# Patient Record
Sex: MALE | Race: WHITE | NOT HISPANIC OR LATINO | Employment: OTHER | URBAN - METROPOLITAN AREA
[De-identification: names, ages, dates, MRNs, and addresses within clinical notes are randomized per-mention and may not be internally consistent; named-entity substitution may affect disease eponyms.]

---

## 2023-10-29 ENCOUNTER — HOSPITAL ENCOUNTER (EMERGENCY)
Facility: HOSPITAL | Age: 38
Discharge: HOME | DRG: 885 | End: 2023-10-30
Attending: EMERGENCY MEDICINE | Admitting: EMERGENCY MEDICINE
Payer: MEDICARE

## 2023-10-29 DIAGNOSIS — F32.A DEPRESSION, UNSPECIFIED DEPRESSION TYPE: ICD-10-CM

## 2023-10-29 DIAGNOSIS — F19.10 SUBSTANCE ABUSE: Primary | ICD-10-CM

## 2023-10-29 LAB
ALBUMIN SERPL-MCNC: 4.8 G/DL (ref 3.5–5.2)
ALBUMIN/GLOB SERPL: 1.7 G/DL
ALP SERPL-CCNC: 125 U/L (ref 39–117)
ALT SERPL W P-5'-P-CCNC: 10 U/L (ref 1–41)
AMPHET+METHAMPHET UR QL: POSITIVE
ANION GAP SERPL CALCULATED.3IONS-SCNC: 12.3 MMOL/L (ref 5–15)
APAP SERPL-MCNC: <5 MCG/ML (ref 0–30)
AST SERPL-CCNC: 12 U/L (ref 1–40)
BARBITURATES UR QL SCN: NEGATIVE
BASOPHILS # BLD AUTO: 0.13 10*3/MM3 (ref 0–0.2)
BASOPHILS NFR BLD AUTO: 1.1 % (ref 0–1.5)
BENZODIAZ UR QL SCN: NEGATIVE
BILIRUB SERPL-MCNC: 0.5 MG/DL (ref 0–1.2)
BUN SERPL-MCNC: 24 MG/DL (ref 6–20)
BUN/CREAT SERPL: 25.3 (ref 7–25)
CALCIUM SPEC-SCNC: 9.3 MG/DL (ref 8.6–10.5)
CANNABINOIDS SERPL QL: NEGATIVE
CHLORIDE SERPL-SCNC: 96 MMOL/L (ref 98–107)
CO2 SERPL-SCNC: 26.7 MMOL/L (ref 22–29)
COCAINE UR QL: NEGATIVE
CREAT SERPL-MCNC: 0.95 MG/DL (ref 0.76–1.27)
DEPRECATED RDW RBC AUTO: 37.8 FL (ref 37–54)
EGFRCR SERPLBLD CKD-EPI 2021: 106.4 ML/MIN/1.73
EOSINOPHIL # BLD AUTO: 0.2 10*3/MM3 (ref 0–0.4)
EOSINOPHIL NFR BLD AUTO: 1.8 % (ref 0.3–6.2)
ERYTHROCYTE [DISTWIDTH] IN BLOOD BY AUTOMATED COUNT: 12.6 % (ref 12.3–15.4)
ETHANOL BLD-MCNC: <10 MG/DL (ref 0–10)
ETHANOL UR QL: <0.01 %
FENTANYL UR-MCNC: NEGATIVE NG/ML
GLOBULIN UR ELPH-MCNC: 2.8 GM/DL
GLUCOSE SERPL-MCNC: 93 MG/DL (ref 65–99)
HCT VFR BLD AUTO: 47.1 % (ref 37.5–51)
HGB BLD-MCNC: 16.1 G/DL (ref 13–17.7)
HOLD SPECIMEN: NORMAL
HOLD SPECIMEN: NORMAL
IMM GRANULOCYTES # BLD AUTO: 0.05 10*3/MM3 (ref 0–0.05)
IMM GRANULOCYTES NFR BLD AUTO: 0.4 % (ref 0–0.5)
LYMPHOCYTES # BLD AUTO: 3.71 10*3/MM3 (ref 0.7–3.1)
LYMPHOCYTES NFR BLD AUTO: 32.5 % (ref 19.6–45.3)
MCH RBC QN AUTO: 28.1 PG (ref 26.6–33)
MCHC RBC AUTO-ENTMCNC: 34.2 G/DL (ref 31.5–35.7)
MCV RBC AUTO: 82.3 FL (ref 79–97)
METHADONE UR QL SCN: NEGATIVE
MONOCYTES # BLD AUTO: 0.93 10*3/MM3 (ref 0.1–0.9)
MONOCYTES NFR BLD AUTO: 8.2 % (ref 5–12)
NEUTROPHILS NFR BLD AUTO: 56 % (ref 42.7–76)
NEUTROPHILS NFR BLD AUTO: 6.39 10*3/MM3 (ref 1.7–7)
NRBC BLD AUTO-RTO: 0 /100 WBC (ref 0–0.2)
OPIATES UR QL: NEGATIVE
OXYCODONE UR QL SCN: NEGATIVE
PLATELET # BLD AUTO: 381 10*3/MM3 (ref 140–450)
PMV BLD AUTO: 9.8 FL (ref 6–12)
POTASSIUM SERPL-SCNC: 3.8 MMOL/L (ref 3.5–5.2)
PROT SERPL-MCNC: 7.6 G/DL (ref 6–8.5)
RBC # BLD AUTO: 5.72 10*6/MM3 (ref 4.14–5.8)
SALICYLATES SERPL-MCNC: <0.3 MG/DL
SODIUM SERPL-SCNC: 135 MMOL/L (ref 136–145)
WBC NRBC COR # BLD: 11.41 10*3/MM3 (ref 3.4–10.8)
WHOLE BLOOD HOLD COAG: NORMAL
WHOLE BLOOD HOLD SPECIMEN: NORMAL

## 2023-10-29 PROCEDURE — 80307 DRUG TEST PRSMV CHEM ANLYZR: CPT | Performed by: EMERGENCY MEDICINE

## 2023-10-29 PROCEDURE — 85025 COMPLETE CBC W/AUTO DIFF WBC: CPT | Performed by: EMERGENCY MEDICINE

## 2023-10-29 PROCEDURE — 36415 COLL VENOUS BLD VENIPUNCTURE: CPT

## 2023-10-29 PROCEDURE — 82077 ASSAY SPEC XCP UR&BREATH IA: CPT | Performed by: EMERGENCY MEDICINE

## 2023-10-29 PROCEDURE — 99283 EMERGENCY DEPT VISIT LOW MDM: CPT

## 2023-10-29 PROCEDURE — 80053 COMPREHEN METABOLIC PANEL: CPT | Performed by: EMERGENCY MEDICINE

## 2023-10-29 PROCEDURE — 80179 DRUG ASSAY SALICYLATE: CPT | Performed by: EMERGENCY MEDICINE

## 2023-10-29 PROCEDURE — 80143 DRUG ASSAY ACETAMINOPHEN: CPT | Performed by: EMERGENCY MEDICINE

## 2023-10-29 RX ORDER — SODIUM CHLORIDE 0.9 % (FLUSH) 0.9 %
10 SYRINGE (ML) INJECTION AS NEEDED
Status: DISCONTINUED | OUTPATIENT
Start: 2023-10-29 | End: 2023-10-30 | Stop reason: HOSPADM

## 2023-10-30 ENCOUNTER — HOSPITAL ENCOUNTER (INPATIENT)
Facility: HOSPITAL | Age: 38
LOS: 4 days | Discharge: HOME | DRG: 885 | End: 2023-11-03
Attending: PSYCHIATRY & NEUROLOGY | Admitting: PSYCHIATRY & NEUROLOGY
Payer: MEDICARE

## 2023-10-30 ENCOUNTER — HOSPITAL ENCOUNTER (EMERGENCY)
Facility: HOSPITAL | Age: 38
Discharge: PSYCHIATRIC OR SUBSTANCE USE FACILITY | DRG: 885 | End: 2023-10-30
Attending: EMERGENCY MEDICINE | Admitting: EMERGENCY MEDICINE
Payer: MEDICARE

## 2023-10-30 VITALS
TEMPERATURE: 98.4 F | OXYGEN SATURATION: 98 % | WEIGHT: 127.87 LBS | BODY MASS INDEX: 18.94 KG/M2 | HEART RATE: 64 BPM | RESPIRATION RATE: 16 BRPM | SYSTOLIC BLOOD PRESSURE: 116 MMHG | DIASTOLIC BLOOD PRESSURE: 73 MMHG | HEIGHT: 69 IN

## 2023-10-30 VITALS
OXYGEN SATURATION: 97 % | SYSTOLIC BLOOD PRESSURE: 118 MMHG | DIASTOLIC BLOOD PRESSURE: 72 MMHG | HEIGHT: 69 IN | RESPIRATION RATE: 17 BRPM | HEART RATE: 72 BPM | WEIGHT: 127 LBS | BODY MASS INDEX: 18.81 KG/M2 | TEMPERATURE: 98.4 F

## 2023-10-30 DIAGNOSIS — F20.9 SCHIZOPHRENIA, UNSPECIFIED TYPE: ICD-10-CM

## 2023-10-30 DIAGNOSIS — F15.10 METHAMPHETAMINE ABUSE: Primary | ICD-10-CM

## 2023-10-30 PROBLEM — F33.2 SEVERE RECURRENT MAJOR DEPRESSION WITHOUT PSYCHOTIC FEATURES: Status: ACTIVE | Noted: 2023-10-30

## 2023-10-30 LAB
ALBUMIN SERPL-MCNC: 4.8 G/DL (ref 3.5–5.2)
ALBUMIN/GLOB SERPL: 1.8 G/DL
ALP SERPL-CCNC: 119 U/L (ref 39–117)
ALT SERPL W P-5'-P-CCNC: 10 U/L (ref 1–41)
AMPHET+METHAMPHET UR QL: POSITIVE
ANION GAP SERPL CALCULATED.3IONS-SCNC: 11.7 MMOL/L (ref 5–15)
APAP SERPL-MCNC: <5 MCG/ML (ref 0–30)
AST SERPL-CCNC: 13 U/L (ref 1–40)
BARBITURATES UR QL SCN: NEGATIVE
BASOPHILS # BLD AUTO: 0.1 10*3/MM3 (ref 0–0.2)
BASOPHILS NFR BLD AUTO: 1 % (ref 0–1.5)
BENZODIAZ UR QL SCN: NEGATIVE
BILIRUB SERPL-MCNC: 0.6 MG/DL (ref 0–1.2)
BUN SERPL-MCNC: 25 MG/DL (ref 6–20)
BUN/CREAT SERPL: 27.8 (ref 7–25)
CALCIUM SPEC-SCNC: 9.5 MG/DL (ref 8.6–10.5)
CANNABINOIDS SERPL QL: NEGATIVE
CHLORIDE SERPL-SCNC: 97 MMOL/L (ref 98–107)
CO2 SERPL-SCNC: 26.3 MMOL/L (ref 22–29)
COCAINE UR QL: NEGATIVE
CREAT SERPL-MCNC: 0.9 MG/DL (ref 0.76–1.27)
DEPRECATED RDW RBC AUTO: 37.6 FL (ref 37–54)
EGFRCR SERPLBLD CKD-EPI 2021: 113.5 ML/MIN/1.73
EOSINOPHIL # BLD AUTO: 0.15 10*3/MM3 (ref 0–0.4)
EOSINOPHIL NFR BLD AUTO: 1.4 % (ref 0.3–6.2)
ERYTHROCYTE [DISTWIDTH] IN BLOOD BY AUTOMATED COUNT: 12.5 % (ref 12.3–15.4)
ETHANOL BLD-MCNC: <10 MG/DL (ref 0–10)
ETHANOL UR QL: <0.01 %
FENTANYL UR-MCNC: NEGATIVE NG/ML
GLOBULIN UR ELPH-MCNC: 2.6 GM/DL
GLUCOSE SERPL-MCNC: 90 MG/DL (ref 65–99)
HCT VFR BLD AUTO: 46.6 % (ref 37.5–51)
HGB BLD-MCNC: 16.1 G/DL (ref 13–17.7)
IMM GRANULOCYTES # BLD AUTO: 0.05 10*3/MM3 (ref 0–0.05)
IMM GRANULOCYTES NFR BLD AUTO: 0.5 % (ref 0–0.5)
LYMPHOCYTES # BLD AUTO: 2.58 10*3/MM3 (ref 0.7–3.1)
LYMPHOCYTES NFR BLD AUTO: 24.9 % (ref 19.6–45.3)
MCH RBC QN AUTO: 28.5 PG (ref 26.6–33)
MCHC RBC AUTO-ENTMCNC: 34.5 G/DL (ref 31.5–35.7)
MCV RBC AUTO: 82.6 FL (ref 79–97)
METHADONE UR QL SCN: NEGATIVE
MONOCYTES # BLD AUTO: 0.84 10*3/MM3 (ref 0.1–0.9)
MONOCYTES NFR BLD AUTO: 8.1 % (ref 5–12)
NEUTROPHILS NFR BLD AUTO: 6.65 10*3/MM3 (ref 1.7–7)
NEUTROPHILS NFR BLD AUTO: 64.1 % (ref 42.7–76)
NRBC BLD AUTO-RTO: 0 /100 WBC (ref 0–0.2)
OPIATES UR QL: NEGATIVE
OXYCODONE UR QL SCN: NEGATIVE
PLATELET # BLD AUTO: 369 10*3/MM3 (ref 140–450)
PMV BLD AUTO: 10.2 FL (ref 6–12)
POTASSIUM SERPL-SCNC: 4.2 MMOL/L (ref 3.5–5.2)
PROT SERPL-MCNC: 7.4 G/DL (ref 6–8.5)
RBC # BLD AUTO: 5.64 10*6/MM3 (ref 4.14–5.8)
SALICYLATES SERPL-MCNC: <0.3 MG/DL
SODIUM SERPL-SCNC: 135 MMOL/L (ref 136–145)
WBC NRBC COR # BLD: 10.37 10*3/MM3 (ref 3.4–10.8)

## 2023-10-30 PROCEDURE — 82077 ASSAY SPEC XCP UR&BREATH IA: CPT | Performed by: EMERGENCY MEDICINE

## 2023-10-30 PROCEDURE — 80307 DRUG TEST PRSMV CHEM ANLYZR: CPT | Performed by: EMERGENCY MEDICINE

## 2023-10-30 PROCEDURE — 85025 COMPLETE CBC W/AUTO DIFF WBC: CPT | Performed by: EMERGENCY MEDICINE

## 2023-10-30 PROCEDURE — 80179 DRUG ASSAY SALICYLATE: CPT | Performed by: EMERGENCY MEDICINE

## 2023-10-30 PROCEDURE — 36415 COLL VENOUS BLD VENIPUNCTURE: CPT

## 2023-10-30 PROCEDURE — 99284 EMERGENCY DEPT VISIT MOD MDM: CPT

## 2023-10-30 PROCEDURE — 80053 COMPREHEN METABOLIC PANEL: CPT | Performed by: EMERGENCY MEDICINE

## 2023-10-30 PROCEDURE — 80143 DRUG ASSAY ACETAMINOPHEN: CPT | Performed by: EMERGENCY MEDICINE

## 2023-10-30 RX ORDER — LORAZEPAM 2 MG/1
2 TABLET ORAL EVERY 4 HOURS PRN
Status: DISCONTINUED | OUTPATIENT
Start: 2023-10-30 | End: 2023-11-03 | Stop reason: HOSPADM

## 2023-10-30 RX ORDER — ALUMINA, MAGNESIA, AND SIMETHICONE 2400; 2400; 240 MG/30ML; MG/30ML; MG/30ML
15 SUSPENSION ORAL EVERY 6 HOURS PRN
Status: DISCONTINUED | OUTPATIENT
Start: 2023-10-30 | End: 2023-11-03 | Stop reason: HOSPADM

## 2023-10-30 RX ORDER — OLANZAPINE 20 MG/1
20 TABLET ORAL NIGHTLY
COMMUNITY
End: 2023-11-03 | Stop reason: HOSPADM

## 2023-10-30 RX ORDER — NICOTINE 21 MG/24HR
1 PATCH, TRANSDERMAL 24 HOURS TRANSDERMAL DAILY PRN
Status: DISCONTINUED | OUTPATIENT
Start: 2023-10-30 | End: 2023-11-03 | Stop reason: HOSPADM

## 2023-10-30 RX ORDER — DIPHENHYDRAMINE HYDROCHLORIDE 50 MG/ML
50 INJECTION INTRAMUSCULAR; INTRAVENOUS EVERY 4 HOURS PRN
Status: DISCONTINUED | OUTPATIENT
Start: 2023-10-30 | End: 2023-11-03 | Stop reason: HOSPADM

## 2023-10-30 RX ORDER — SERTRALINE HYDROCHLORIDE 100 MG/1
200 TABLET, FILM COATED ORAL DAILY
COMMUNITY
End: 2023-11-03 | Stop reason: HOSPADM

## 2023-10-30 RX ORDER — ACETAMINOPHEN 325 MG/1
650 TABLET ORAL EVERY 4 HOURS PRN
Status: DISCONTINUED | OUTPATIENT
Start: 2023-10-30 | End: 2023-11-03 | Stop reason: HOSPADM

## 2023-10-30 RX ORDER — HALOPERIDOL 5 MG/ML
5 INJECTION INTRAMUSCULAR EVERY 4 HOURS PRN
Status: DISCONTINUED | OUTPATIENT
Start: 2023-10-30 | End: 2023-11-03 | Stop reason: HOSPADM

## 2023-10-30 RX ORDER — DIPHENHYDRAMINE HCL 50 MG
50 CAPSULE ORAL EVERY 4 HOURS PRN
Status: DISCONTINUED | OUTPATIENT
Start: 2023-10-30 | End: 2023-11-03 | Stop reason: HOSPADM

## 2023-10-30 RX ORDER — TRAZODONE HYDROCHLORIDE 50 MG/1
100 TABLET ORAL NIGHTLY PRN
Status: DISCONTINUED | OUTPATIENT
Start: 2023-10-30 | End: 2023-11-03 | Stop reason: HOSPADM

## 2023-10-30 RX ORDER — HYDROXYZINE PAMOATE 50 MG/1
50 CAPSULE ORAL EVERY 6 HOURS PRN
Status: DISCONTINUED | OUTPATIENT
Start: 2023-10-30 | End: 2023-11-03 | Stop reason: HOSPADM

## 2023-10-30 RX ORDER — LORAZEPAM 2 MG/ML
2 INJECTION INTRAMUSCULAR EVERY 4 HOURS PRN
Status: DISCONTINUED | OUTPATIENT
Start: 2023-10-30 | End: 2023-11-03 | Stop reason: HOSPADM

## 2023-10-30 RX ORDER — HALOPERIDOL 5 MG/1
5 TABLET ORAL EVERY 4 HOURS PRN
Status: DISCONTINUED | OUTPATIENT
Start: 2023-10-30 | End: 2023-11-03 | Stop reason: HOSPADM

## 2023-10-30 RX ORDER — OLANZAPINE 10 MG/1
10 TABLET ORAL NIGHTLY
Status: DISCONTINUED | OUTPATIENT
Start: 2023-10-30 | End: 2023-11-01

## 2023-10-30 RX ORDER — BENZTROPINE MESYLATE 1 MG/1
1 TABLET ORAL 2 TIMES DAILY
COMMUNITY
End: 2023-10-30

## 2023-10-30 RX ORDER — LOPERAMIDE HYDROCHLORIDE 2 MG/1
2 CAPSULE ORAL
Status: DISCONTINUED | OUTPATIENT
Start: 2023-10-30 | End: 2023-11-03 | Stop reason: HOSPADM

## 2023-10-30 RX ADMIN — OLANZAPINE 10 MG: 10 TABLET, FILM COATED ORAL at 21:19

## 2023-10-30 RX ADMIN — SERTRALINE HYDROCHLORIDE 50 MG: 50 TABLET ORAL at 16:41

## 2023-10-30 NOTE — SIGNIFICANT NOTE
10/30/23 1010   Plan   Plan Comments Pt is agreeable to go to rehab for substance use treatment. SW contacted [facility] who has bed availability. SW sent over labs and SW assessment. Awaiting call for patient to complete phone assessment.

## 2023-10-30 NOTE — NURSING NOTE
PT ARRIVED AT 1547 FROM ED ESCORTED BY SECURITY VIA W/C PT IS ADMITTED VOLUNTARY UNDER THE CARE OF DR. NAGEL FOR MAJOR DEPRESSION WITHOUT PSYCHOTIC FEATURES. PT SEARCHED BY SECURITY PER PROTOCOL AND BELONGINGS SEARCH. PT IS COOPERATIVE BUT ANXIOUS, HYPERVERBAL, ILLOGICAL, NONSENSICAL AT TIMES, POOR EYE CONTACT, PT IS HAVING FREQUENT JERKY  BODY MOVEMENTS, POSITIVE FOR METHAMPHETAMINES, PT DENIES CURRENT THOUGHTS OF SUICIDE AND DENIES HI AND DENIES [facility], [facility] SUICIDE RISK SCORE IS LOW PT  STATES HE LIVES WITH FATHER AND GRANDMOTHER IN [place], [place] AND UNEMPLOYED, PT STATES HE DOES NOT USE ALCOHOL AND HAS BEEN TO [facility]. PT STATES HIS HIGHEST LEVEL OF EDUCATION IS 9TH GRADE, PT IS HARD TO ENGAGE DUE TO MENTAL STATUS AND UNABLE TO ANSWER SOME QUESTIONS AND GIVES VAGUE ANSWERS AT TIMES. PT DENIES ANXIETY OR DEPRESSION. PT STATES HE HAS LOST A SIGNIFICANT AMOUNT OF WEIGHT DUE TO NO APPETITE. SUPPLEMENT ORDERS IN PLACE. PT DENIES PAIN, PT ATE SUPPER AND HAD A COLA. AND WAS LAUGHING INAPPROPRIATELY WIT A PEER AT THE DAYROOM TABLE, NO S/S OF DISTRESS AT THIS TIME, PT HAS HISTORY OF SUBSTANCE ABUSE AND IS WANTING TO GO TO REHAB, PT STATE HE IS SUPPOSED TO BE ON MEDICATIONS BUT HASN'T BEEN TAKING THEM, PT ORIENTED TO UNIT. WILL CONTINUE TO MONITOR PT AND PROVIDE SAFE ENVIRONMENT, PT TOOK [place]OLOFT WITHOUT ISSUES.

## 2023-10-30 NOTE — ED PROVIDER NOTES
"Time: 12:47 AM EDT  Date of encounter:  10/29/2023  Independent Historian/Clinical History and Information was obtained by:   Patient    History is limited by: N/A    Chief Complaint: suicide ideation      History of Present Illness:  Patient is a 38 y.o. year old male who presents to the emergency department for evaluation of Suicide ideation.  Patient denies any specific plan.  Says been feeling like this for quite some time.  Denies any association.  No audio/visual hallucinations.  He does admit to methamphetamine abuse.  No other complaints.    HPI    Patient Care Team  Primary Care Provider: Amanda Macias MD    Past Medical History:     No Known Allergies  History reviewed. No pertinent past medical history.  History reviewed. No pertinent surgical history.  History reviewed. No pertinent family history.    Home Medications:  Prior to Admission medications    Not on File        Social History:   Social History     Substance Use Topics    Drug use: Yes     Types: Methamphetamines         Review of Systems:  Review of Systems   Constitutional:  Negative for chills and fever.   HENT:  Negative for congestion, ear pain and sore throat.    Eyes:  Negative for pain.   Respiratory:  Negative for cough, chest tightness and shortness of breath.    Cardiovascular:  Negative for chest pain.   Gastrointestinal:  Negative for abdominal pain, diarrhea, nausea and vomiting.   Genitourinary:  Negative for flank pain and hematuria.   Musculoskeletal:  Negative for joint swelling.   Skin:  Negative for pallor.   Neurological:  Negative for seizures and headaches.   Psychiatric/Behavioral:  The patient is nervous/anxious.    All other systems reviewed and are negative.       Physical Exam:  /73 (BP Location: Right arm, Patient Position: Lying)   Pulse 64   Temp 98.4 °F (36.9 °C) (Oral)   Resp 16   Ht 175.3 cm (69\")   Wt 58 kg (127 lb 13.9 oz)   SpO2 98%   BMI 18.88 kg/m²     Physical Exam  Constitutional:       " Appearance: Normal appearance.   HENT:      Head: Normocephalic and atraumatic.      Nose: Nose normal.      Mouth/Throat:      Mouth: Mucous membranes are moist.   Eyes:      Extraocular Movements: Extraocular movements intact.      Conjunctiva/sclera: Conjunctivae normal.      Pupils: Pupils are equal, round, and reactive to light.   Cardiovascular:      Rate and Rhythm: Normal rate and regular rhythm.      Pulses: Normal pulses.      Heart sounds: Normal heart sounds.   Pulmonary:      Effort: Pulmonary effort is normal.      Breath sounds: Normal breath sounds.   Abdominal:      General: There is no distension.      Palpations: Abdomen is soft.      Tenderness: There is no abdominal tenderness.   Musculoskeletal:         General: Normal range of motion.      Cervical back: Normal range of motion.   Skin:     General: Skin is warm and dry.      Capillary Refill: Capillary refill takes less than 2 seconds.   Neurological:      General: No focal deficit present.      Mental Status: He is alert and oriented to person, place, and time. Mental status is at baseline.   Psychiatric:         Mood and Affect: Mood normal.         Behavior: Behavior normal.                  Procedures:  Procedures      Medical Decision Making:      Comorbidities that affect care:    Substance Abuse    External Notes reviewed:    None      The following orders were placed and all results were independently analyzed by me:  Orders Placed This Encounter   Procedures    [place] Draw    Comprehensive Metabolic Panel    Acetaminophen Level    Ethanol    Salicylate Level    Urine Drug Screen - Urine, Clean Catch    CBC Auto Differential    NPO Diet NPO Type: Strict NPO    Continuous Pulse Oximetry    Vital Signs    Undress & Gown    Psych / Access to See    Inpatient [facility] Consult    Oxygen Therapy- Nasal Cannula; Titrate 1-6 LPM Per SpO2; 90 - 95%    POC Glucose Once    Insert Peripheral IV    Suicide Precautions    CBC & Differential     Green Top (Gel)    Lavender Top    Gold Top - SST    Light Blue Top       Medications Given in the Emergency Department:  Medications   sodium chloride 0.9 % flush 10 mL (has no administration in time range)        ED Course:         Labs:    Lab Results (last 24 hours)       Procedure Component Value Units Date/Time    CBC & Differential [[id number]]  (Abnormal) Collected: 10/29/23 2202    Specimen: Blood Updated: 10/29/23 2212    Narrative:      The following orders were created for panel order CBC & Differential.  Procedure                               Abnormality         Status                     ---------                               -----------         ------                     CBC Auto Differential[[id number]]        Abnormal            Final result                 Please view results for these tests on the individual orders.    Comprehensive Metabolic Panel [[id number]]  (Abnormal) Collected: 10/29/23 2202    Specimen: Blood Updated: 10/29/23 2231     Glucose 93 mg/dL      BUN 24 mg/dL      Creatinine 0.95 mg/dL      Sodium 135 mmol/L      Potassium 3.8 mmol/L      Chloride 96 mmol/L      CO2 26.7 mmol/L      Calcium 9.3 mg/dL      Total Protein 7.6 g/dL      Albumin 4.8 g/dL      ALT (SGPT) 10 U/L      AST (SGOT) 12 U/L      Alkaline Phosphatase 125 U/L      Total Bilirubin 0.5 mg/dL      Globulin 2.8 gm/dL      A/G Ratio 1.7 g/dL      BUN/Creatinine Ratio 25.3     Anion Gap 12.3 mmol/L      eGFR 106.4 mL/min/1.73     Narrative:      GFR Normal >60  Chronic Kidney Disease <60  Kidney Failure <15      Acetaminophen Level [[id number]]  (Normal) Collected: 10/29/23 2202    Specimen: Blood Updated: 10/29/23 2231     Acetaminophen <5.0 mcg/mL     Ethanol [[id number]] Collected: 10/29/23 2202    Specimen: Blood Updated: 10/29/23 2231     Ethanol <10 mg/dL      Ethanol % <0.010 %     Narrative:      Ethanol (Plasma)  <10 Essentially Negative    Toxic Concentrations           mg/dL    Flushing, slowing of reflexes     Impaired visual activity         Depression of CNS              >100  Possible Coma                  >300       Salicylate Level [[id number]]  (Normal) Collected: 10/29/23 2202    Specimen: Blood Updated: 10/29/23 2231     Salicylate <0.3 mg/dL     CBC Auto Differential [[id number]]  (Abnormal) Collected: 10/29/23 2202    Specimen: Blood Updated: 10/29/23 2212     WBC 11.41 10*3/mm3      RBC 5.72 10*6/mm3      Hemoglobin 16.1 g/dL      Hematocrit 47.1 %      MCV 82.3 fL      MCH 28.1 pg      MCHC 34.2 g/dL      RDW 12.6 %      RDW-SD 37.8 fl      MPV 9.8 fL      Platelets 381 10*3/mm3      Neutrophil % 56.0 %      Lymphocyte % 32.5 %      Monocyte % 8.2 %      Eosinophil % 1.8 %      Basophil % 1.1 %      Immature Grans % 0.4 %      Neutrophils, Absolute 6.39 10*3/mm3      Lymphocytes, Absolute 3.71 10*3/mm3      Monocytes, Absolute 0.93 10*3/mm3      Eosinophils, Absolute 0.20 10*3/mm3      Basophils, Absolute 0.13 10*3/mm3      Immature Grans, Absolute 0.05 10*3/mm3      nRBC 0.0 /100 WBC     Urine Drug Screen - Urine, Clean Catch [[id number]]  (Abnormal) Collected: 10/29/23 2256    Specimen: Urine, Clean Catch Updated: 10/29/23 2319     Amphet/Methamphet, Screen Positive     Barbiturates Screen, Urine Negative     Benzodiazepine Screen, Urine Negative     Cocaine Screen, Urine Negative     Opiate Screen Negative     THC, Screen, Urine Negative     Methadone Screen, Urine Negative     Oxycodone Screen, Urine Negative     Fentanyl, Urine Negative    Narrative:      Negative Thresholds Per Drugs Screened:    Amphetamines                 500 ng/ml  Barbiturates                 200 ng/ml  Benzodiazepines              100 ng/ml  Cocaine                      300 ng/ml  Methadone                    300 ng/ml  Opiates                      300 ng/ml  Oxycodone                    100 ng/ml  THC                           50 ng/ml  Fentanyl                       5 ng/ml      The Normal Value for all drugs tested is  negative. This report includes final unconfirmed screening results to be used for medical treatment purposes only. Unconfirmed results must not be used for non-medical purposes such as employment or legal testing. Clinical consideration should be applied to any drug of abuse test, particularly when unconfirmed results are used.                     Imaging:    No Radiology Exams Resulted Within Past 24 Hours      Differential Diagnosis and Discussion:    Psychiatric: Differential diagnosis includes but is not limited to depression, psychosis, bipolar disorder, anxiety, manic episode, schizophrenia, and substance abuse.    All labs were reviewed and interpreted by me.    MDM  Number of Diagnoses or Management Options  Diagnosis management comments: Patient is afebrile nontoxic-appearing.  Vital signs are stable.  Patient initially presented with report of suicidal ideation.  Patient denies any specific plan.  He does admit to substance abuse.  Labs were obtained and showed no significant abnormality.  We attempted contact [facility]are but was unable to get in touch with anyone.  Reevaluation patient states he is not suicidal currently.  He states he feels safe to go home.  Patient was monitored for over 8 hours he continued to state that he is not suicidal anymore and wants to go home.  Recommend that he follows up with a psychologist/psychiatrist as well as his primary physician.  Recommend that he stop using drugs.  Patient agreed to return to the emergency department for any concerning symptoms or any thoughts of suicide.  He denies HI/AH/VH.  Discussed return precautions, discharge instructions and answered all his questions.       Amount and/or Complexity of Data Reviewed  Clinical lab tests: reviewed  Review and summarize past medical records: yes  Independent visualization of images, tracings, or specimens: yes    Risk of Complications, Morbidity, and/or Mortality  Presenting problems: moderate  Management  options: moderate    Patient Progress  Patient progress: stable             Patient Care Considerations:    CT HEAD: I considered ordering a noncontrast CT of the head, however patient is alert and oriented with no focal deficits      Consultants/Shared Management Plan:    None    Social Determinants of Health:    Patient is independent, reliable, and has access to care.       Disposition and Care Coordination:    Discharged: I considered escalation of care by admitting this patient for observation, however the patient has improved and is suitable and  stable for discharge.    I have explained the patient´s condition, diagnoses and treatment plan based on the information available to me at this time. I have answered questions and addressed any concerns. The patient has a good  understanding of the patient´s diagnosis, condition, and treatment plan as can be expected at this point. The vital signs have been stable. The patient´s condition is stable and appropriate for discharge from the emergency department.      The patient will pursue further outpatient evaluation with the primary care physician or other designated or consulting physician as outlined in the discharge instructions. They are agreeable to this plan of care and follow-up instructions have been explained in detail. The patient has received these instructions in written format and have expressed an understanding of the discharge instructions. The patient is aware that any significant change in condition or worsening of symptoms should prompt an immediate return to this or the closest emergency department or call to 911.  I have explained discharge medications and the need for follow up with the patient/caretakers. This was also printed in the discharge instructions. Patient was discharged with the following medications and follow up:      Medication List      No changes were made to your prescriptions during this visit.      Amanda Macias MD  [address]  [place] [address]  [phone]    In 2 days         Final diagnoses:   Substance abuse   Depression, unspecified depression type        ED Disposition       ED Disposition   Discharge    Condition   Stable    Comment   --               This medical record created using voice recognition software.             Ezio Nobles MD  10/30/23 0557

## 2023-10-30 NOTE — ED PROVIDER NOTES
"Time: 8:38 AM EDT  Date of encounter:  10/30/2023  Independent Historian/Clinical History and Information was obtained by:   Patient    History is limited by:  Poor historian    Chief Complaint: Methamphetamine abuse      History of Present Illness:  Patient is a 38 y.o. year old male who presents to the emergency department for evaluation of methamphetamine abuse.  This patient was seen last night in the emergency department for psychiatric evaluation.  Patient had stated he had had suicidal thoughts but was not presently having them.  He denies that today.  Patient does admit to methamphetamine abuse and was positive on his urine drug screen.  Patient today was apparently wandering on the hospital grounds and seemed confused and was brought back to the emergency department.  Patient is stating he would be interested in drug rehab.  Patient has history of schizophrenia and is on disability and is not currently taking his medications which include Cogentin, Zyprexa and Zoloft.    HPI    Patient Care Team  Primary Care Provider: Amanda Macias MD    Past Medical History:     No Known Allergies  Past Medical History:   Diagnosis Date    Schizophrenia      History reviewed. No pertinent surgical history.  History reviewed. No pertinent family history.    Home Medications:  Prior to Admission medications    Not on File        Social History:   Social History     Substance Use Topics    Drug use: Yes     Types: Methamphetamines         Review of Systems:  Review of Systems   Psychiatric/Behavioral:  Positive for confusion.         Physical Exam:  /76   Pulse 64   Temp 98.4 °F (36.9 °C) (Oral)   Resp 17   Ht 175.3 cm (69\")   Wt 57.6 kg (127 lb)   SpO2 95%   BMI 18.75 kg/m²     Physical Exam  Vitals and nursing note reviewed.   Constitutional:       Appearance: Normal appearance.   HENT:      Head: Normocephalic and atraumatic.   Eyes:      Extraocular Movements: Extraocular movements intact.   Cardiovascular: "      Rate and Rhythm: Normal rate and regular rhythm.      Heart sounds: Normal heart sounds.   Pulmonary:      Effort: Pulmonary effort is normal.      Breath sounds: Normal breath sounds.   Musculoskeletal:         General: Normal range of motion.      Cervical back: Normal range of motion.   Skin:     General: Skin is warm and dry.   Neurological:      Mental Status: He is alert and oriented to person, place, and time.   Psychiatric:         Attention and Perception: Attention normal.         Mood and Affect: Mood normal.         Speech: Speech normal.         Behavior: Behavior is cooperative.         Thought Content: Thought content normal.         Cognition and Memory: Cognition is impaired.         Judgment: Judgment normal.                  Procedures:  Procedures      Medical Decision Making:      Comorbidities that affect care:    Substance Abuse    External Notes reviewed:    Previous ED Note: Emergency department note from last night.      The following orders were placed and all results were independently analyzed by me:  Orders Placed This Encounter   Procedures    Urine Drug Screen - Urine, Clean Catch    Comprehensive Metabolic Panel    Acetaminophen Level    Salicylate Level    Ethanol    CBC Auto Differential    General MD Inpatient Consult    CBC & Differential       Medications Given in the Emergency Department:  Medications - No data to display     ED Course:         Labs:    Lab Results (last 24 hours)       Procedure Component Value Units Date/Time    CBC & Differential [[id number]]  (Abnormal) Collected: 10/29/23 2202    Specimen: Blood Updated: 10/29/23 2212    Narrative:      The following orders were created for panel order CBC & Differential.  Procedure                               Abnormality         Status                     ---------                               -----------         ------                     CBC Auto Differential[[id number]]        Abnormal            Final result                  Please view results for these tests on the individual orders.    Comprehensive Metabolic Panel [[id number]]  (Abnormal) Collected: 10/29/23 2202    Specimen: Blood Updated: 10/29/23 2231     Glucose 93 mg/dL      BUN 24 mg/dL      Creatinine 0.95 mg/dL      Sodium 135 mmol/L      Potassium 3.8 mmol/L      Chloride 96 mmol/L      CO2 26.7 mmol/L      Calcium 9.3 mg/dL      Total Protein 7.6 g/dL      Albumin 4.8 g/dL      ALT (SGPT) 10 U/L      AST (SGOT) 12 U/L      Alkaline Phosphatase 125 U/L      Total Bilirubin 0.5 mg/dL      Globulin 2.8 gm/dL      A/G Ratio 1.7 g/dL      BUN/Creatinine Ratio 25.3     Anion Gap 12.3 mmol/L      eGFR 106.4 mL/min/1.73     Narrative:      GFR Normal >60  Chronic Kidney Disease <60  Kidney Failure <15      Acetaminophen Level [[id number]]  (Normal) Collected: 10/29/23 2202    Specimen: Blood Updated: 10/29/23 2231     Acetaminophen <5.0 mcg/mL     Ethanol [[id number]] Collected: 10/29/23 2202    Specimen: Blood Updated: 10/29/23 2231     Ethanol <10 mg/dL      Ethanol % <0.010 %     Narrative:      Ethanol (Plasma)  <10 Essentially Negative    Toxic Concentrations           mg/dL    Flushing, slowing of reflexes    Impaired visual activity         Depression of CNS              >100  Possible Coma                  >300       Salicylate Level [[id number]]  (Normal) Collected: 10/29/23 2202    Specimen: Blood Updated: 10/29/23 2231     Salicylate <0.3 mg/dL     CBC Auto Differential [[id number]]  (Abnormal) Collected: 10/29/23 2202    Specimen: Blood Updated: 10/29/23 2212     WBC 11.41 10*3/mm3      RBC 5.72 10*6/mm3      Hemoglobin 16.1 g/dL      Hematocrit 47.1 %      MCV 82.3 fL      MCH 28.1 pg      MCHC 34.2 g/dL      RDW 12.6 %      RDW-SD 37.8 fl      MPV 9.8 fL      Platelets 381 10*3/mm3      Neutrophil % 56.0 %      Lymphocyte % 32.5 %      Monocyte % 8.2 %      Eosinophil % 1.8 %      Basophil % 1.1 %      Immature Grans % 0.4 %      Neutrophils,  Absolute 6.39 10*3/mm3      Lymphocytes, Absolute 3.71 10*3/mm3      Monocytes, Absolute 0.93 10*3/mm3      Eosinophils, Absolute 0.20 10*3/mm3      Basophils, Absolute 0.13 10*3/mm3      Immature Grans, Absolute 0.05 10*3/mm3      nRBC 0.0 /100 WBC     Urine Drug Screen - Urine, Clean Catch [[id number]]  (Abnormal) Collected: 10/29/23 2256    Specimen: Urine, Clean Catch Updated: 10/29/23 2319     Amphet/Methamphet, Screen Positive     Barbiturates Screen, Urine Negative     Benzodiazepine Screen, Urine Negative     Cocaine Screen, Urine Negative     Opiate Screen Negative     THC, Screen, Urine Negative     Methadone Screen, Urine Negative     Oxycodone Screen, Urine Negative     Fentanyl, Urine Negative    Narrative:      Negative Thresholds Per Drugs Screened:    Amphetamines                 500 ng/ml  Barbiturates                 200 ng/ml  Benzodiazepines              100 ng/ml  Cocaine                      300 ng/ml  Methadone                    300 ng/ml  Opiates                      300 ng/ml  Oxycodone                    100 ng/ml  THC                           50 ng/ml  Fentanyl                       5 ng/ml      The Normal Value for all drugs tested is negative. This report includes final unconfirmed screening results to be used for medical treatment purposes only. Unconfirmed results must not be used for non-medical purposes such as employment or legal testing. Clinical consideration should be applied to any drug of abuse test, particularly when unconfirmed results are used.            Urine Drug Screen - Urine, Clean Catch [[id number]]  (Abnormal) Collected: 10/30/23 0954    Specimen: Urine, Clean Catch Updated: 10/30/23 1101     Amphet/Methamphet, Screen Positive     Barbiturates Screen, Urine Negative     Benzodiazepine Screen, Urine Negative     Cocaine Screen, Urine Negative     Opiate Screen Negative     THC, Screen, Urine Negative     Methadone Screen, Urine Negative     Oxycodone Screen, Urine  Negative     Fentanyl, Urine Negative    Narrative:      Negative Thresholds Per Drugs Screened:    Amphetamines                 500 ng/ml  Barbiturates                 200 ng/ml  Benzodiazepines              100 ng/ml  Cocaine                      300 ng/ml  Methadone                    300 ng/ml  Opiates                      300 ng/ml  Oxycodone                    100 ng/ml  THC                           50 ng/ml  Fentanyl                       5 ng/ml      The Normal Value for all drugs tested is negative. This report includes final unconfirmed screening results to be used for medical treatment purposes only. Unconfirmed results must not be used for non-medical purposes such as employment or legal testing. Clinical consideration should be applied to any drug of abuse test, particularly when unconfirmed results are used.            CBC & Differential [[id number]]  (Normal) Collected: 10/30/23 0954    Specimen: Blood Updated: 10/30/23 1012    Narrative:      The following orders were created for panel order CBC & Differential.  Procedure                               Abnormality         Status                     ---------                               -----------         ------                     CBC Auto Differential[[id number]]        Normal              Final result                 Please view results for these tests on the individual orders.    Comprehensive Metabolic Panel [[id number]]  (Abnormal) Collected: 10/30/23 0954    Specimen: Blood Updated: 10/30/23 1042     Glucose 90 mg/dL      BUN 25 mg/dL      Creatinine 0.90 mg/dL      Sodium 135 mmol/L      Potassium 4.2 mmol/L      Chloride 97 mmol/L      CO2 26.3 mmol/L      Calcium 9.5 mg/dL      Total Protein 7.4 g/dL      Albumin 4.8 g/dL      ALT (SGPT) 10 U/L      AST (SGOT) 13 U/L      Alkaline Phosphatase 119 U/L      Total Bilirubin 0.6 mg/dL      Globulin 2.6 gm/dL      A/G Ratio 1.8 g/dL      BUN/Creatinine Ratio 27.8     Anion Gap 11.7 mmol/L       eGFR 113.5 mL/min/1.73     Narrative:      GFR Normal >60  Chronic Kidney Disease <60  Kidney Failure <15      Acetaminophen Level [[id number]]  (Normal) Collected: 10/30/23 0954    Specimen: Blood Updated: 10/30/23 1042     Acetaminophen <5.0 mcg/mL     Salicylate Level [[id number]]  (Normal) Collected: 10/30/23 0954    Specimen: Blood Updated: 10/30/23 1042     Salicylate <0.3 mg/dL     Ethanol [[id number]] Collected: 10/30/23 0954    Specimen: Blood Updated: 10/30/23 1042     Ethanol <10 mg/dL      Ethanol % <0.010 %     Narrative:      Ethanol (Plasma)  <10 Essentially Negative    Toxic Concentrations           mg/dL    Flushing, slowing of reflexes    Impaired visual activity         Depression of CNS              >100  Possible Coma                  >300       CBC Auto Differential [[id number]]  (Normal) Collected: 10/30/23 0954    Specimen: Blood Updated: 10/30/23 1012     WBC 10.37 10*3/mm3      RBC 5.64 10*6/mm3      Hemoglobin 16.1 g/dL      Hematocrit 46.6 %      MCV 82.6 fL      MCH 28.5 pg      MCHC 34.5 g/dL      RDW 12.5 %      RDW-SD 37.6 fl      MPV 10.2 fL      Platelets 369 10*3/mm3      Neutrophil % 64.1 %      Lymphocyte % 24.9 %      Monocyte % 8.1 %      Eosinophil % 1.4 %      Basophil % 1.0 %      Immature Grans % 0.5 %      Neutrophils, Absolute 6.65 10*3/mm3      Lymphocytes, Absolute 2.58 10*3/mm3      Monocytes, Absolute 0.84 10*3/mm3      Eosinophils, Absolute 0.15 10*3/mm3      Basophils, Absolute 0.10 10*3/mm3      Immature Grans, Absolute 0.05 10*3/mm3      nRBC 0.0 /100 WBC              Imaging:    No Radiology Exams Resulted Within Past 24 Hours      Differential Diagnosis and Discussion:    Psychiatric: Differential diagnosis includes but is not limited to depression, psychosis, bipolar disorder, anxiety, manic episode, schizophrenia, and substance abuse.    All labs were reviewed and interpreted by me.    MDM  Patient has history of schizophrenia and is noncompliant with  his medications.  Patient appears very confused and does not appear reliable to care for himself at present.  His drug screen is positive for methamphetamine.  The patient was discussed with , psychiatry, who will admit the patient to [facility] for further evaluation and treatment.        Patient Care Considerations:        Consultants/Shared Management Plan:  Patient discussed with emergency department  who is arranging for the patient to go to a drug rehab program.      Social Determinants of Health:    Patient is independent, reliable, and has access to care.       Disposition and Care Coordination:    Psychiatric Admission: Through independent evaluation of the patient's history and physical and consultation with psychiatry, the patient meets criteria for admission to a psychiatric facility.        Final diagnoses:   Methamphetamine abuse        ED Disposition       ED Disposition   DC/Transfer to Behavioral Health    Condition   Stable    Comment   --               This medical record created using voice recognition software.             Tim Houser,   10/30/23 1252       Tim Houser,   10/30/23 1302

## 2023-10-30 NOTE — SIGNIFICANT NOTE
10/30/23 0914   Substance Use   Substance Use Status current street drug/inhalant/medication abuse   Last Street Drug/Medication/Inhalant Use 10/27/23   Environment Typically Uses Street Drugs alone   Reported Characteristics of Street Drugs withdrawal symptoms   Readiness to Change Street Use preparation   Attempts to Quit Street Drug Use quit on own   Longest Period of Street Drug Sobriety 6 months to 1 year   Street Drug Withdrawal Pattern difficulty concentrating;hallucinations;irritability   Previous Substance Use Treatment none   Substance Use Comment patient reports that he used meth a few days ago.   Family Member Substance Use (#4)   Family History of Substance Use father   Reported Type of Substances alcohol   Alcohol Type beer;liquor   Reported Level of Alcohol Use abuse   Alcohol Quantity unknown amount   Alcohol Frequency patient reports that father drank everyday.   Street Drug/Medication/Inhalant Quantity unknown   Previous Substance Use Treatment none   Substance Use Comments Pt reports that he has never went to rehab before but would like to get help with getting sober. Pt reports that he does suffer from anxiety and depression. Pt reports that he will do things and not remember that he does it. Pt reports that he does have paranoia but unknown if this is drug related. Pt reports that he has attempted to quit on his own and has had 6 months to 1 year of sobriety.

## 2023-10-30 NOTE — SIGNIFICANT NOTE
10/30/23 1117   Plan   Plan Comments Pt has medicare A & B for disability. Patient reports that he has schizophrenia. He states that he wants to be back on his medication and hasn't taken his medication for a while. He reports that he has been established with [facility] but is unknown when he last had an appointment. Pt has difficulty articulating his thoughts and has stated that he has active auditory and visual hallucinations. SW was able to witness patient talking to someone with his head covered. Pt states he has SI and has thoughts of being tortured to death. REESE informed provider and CSW of this information.

## 2023-10-31 PROBLEM — F29 PSYCHOSIS: Status: ACTIVE | Noted: 2023-10-31

## 2023-10-31 PROBLEM — F15.10 AMPHETAMINE ABUSE: Status: ACTIVE | Noted: 2023-10-31

## 2023-10-31 RX ADMIN — OLANZAPINE 10 MG: 10 TABLET, FILM COATED ORAL at 20:11

## 2023-10-31 RX ADMIN — SERTRALINE HYDROCHLORIDE 50 MG: 50 TABLET ORAL at 10:05

## 2023-10-31 NOTE — H&P
[place] [place] PHYSICAL    Patient Name: Jaun Addison  : 1985  MRN: [id number]  Primary Care Physician:  Amanda Macias MD  Date of admission: 10/30/2023    Subjective   Subjective     Chief Complaint: \"Losing my mind\"    HPI:     Jaun Addison is a 38 y.o. male who presented to the emergency room for psychosis and substance abuse.  He is admitted on a voluntary basis.  Patient been in emergency room the day before with depression and suicidal ideations and was discharged home, he returned to the emergency room less than 24 hours with continued psychosis and appeared to be more bizarre.  Patient was referred for admission.    Patient is unable to provide any significant history.  When asked what he meant by losing his mind he states he does not know.  Patient is somewhat uncooperative to the exam.  He is inattentive and gets easily distracted.  Laughs inappropriately at times and is rather bizarre.  States he was picked up [facility] but unable to verify at this history.  When asked about suicidal ideations today he responds, \"Uhm, yes, I guess.\"    Reports he has a history of schizophrenia and when asked to describe what this means or the symptoms he has he responds, \"insanity.\"  States he will feel like he is trapped in a room and unable to comprehend what is going on.    He is distracted throughout the exam.  Appears to be responding to internal stimuli.  Has some thought blocking.  Patient reports he had decreased sleep and states he is not sleeping because he does not want to fall asleep and is exhibiting paranoid thoughts.    He reports feeling sad but is denying suicidal ideation at this time.    When asked about possible referral to drug and alcohol rehabilitation he responds, \"that might be kind of cool\" however immediately begins recanting states that he does not need that.    States he was supposed to be on olanzapine and sertraline, but has not been taking them " "and cannot tell me the last time he took medications.    Patient with inappropriate laughing and possible thought blocking as well as general thought disorganization.  Suspect may have an underlying psychotic disorder that he states was initially diagnosed in 2018.  However presence of methamphetamine clouds situation.  His psychosis does not appear to be typical of others on methamphetamine does not have prominent paranoid delusions of being harmed, and is not exhibiting any agitated behavior or irritability.          Review of Systems:      CONSTITUTIONAL: Feels well denies any acute medical problems  PSYCHIATRIC: As documented in HPI    Personal History     Past Medical History:   Diagnosis Date    Schizophrenia        History reviewed. No pertinent surgical history.    Past Psychiatric History: Denies having current provider.  Reports he saw a provider in the past and states the last time was \"a while ago.\"  Cannot tell me who his previous provider was.  Reports he has a history of schizophrenia that was diagnosed at age 20.  When asked directly if he has previously been under the care of [facility] he responds that he has been.    Psychiatric Hospitalizations: Denies any previous hospitalizations    Suicide Attempts: Reports a history of attempt but cannot state when or what he did    Prior Treatment and Medications Tried: Olanzapine, sertraline      Family History: family history includes Alcohol abuse in his father. Otherwise pertinent FHx was reviewed and not pertinent to current issue.    Family Psych History:None known to patient      Family Suicide History:None known to patient      Social History:     Born and raised in [place].  Currently living in [place] with his father and grandmother.  Has never been  and has no children.  States he has 9th grade education and when asked about learning difficulties special education classes the patient is unable to provide the answer and it is " "unclear if there was a learning impairment or developmental delay.    Never in the     When asked about abuse he responds, \"yes, I think so\"    Social History     Socioeconomic History    Marital status: Single    Years of education: No GED    Highest education level: 9th grade   Tobacco Use    Smoking status: Every Day     Types: Cigarettes   Vaping Use    Vaping Use: Unknown   Substance and Sexual Activity    Alcohol use: Not Currently    Drug use: Yes     Types: Methamphetamines    Sexual activity: Defer       Substance Abuse History: reports that he has been smoking cigarettes. He does not have any smokeless tobacco history on file. He reports that he does not currently use alcohol. He reports current drug use. Drug: Methamphetamines.    Home Medications:   OLANZapine and sertraline      Allergies:  No Known Allergies    Objective   Objective     Vitals:   Temp:  [97.7 °F (36.5 °C)-98.1 °F (36.7 °C)] 98.1 °F (36.7 °C)  Heart Rate:  [62-85] 82  Resp:  [18] 18  BP: (109-144)/() 144/99    Physical Exam:      CONSTITUTIONAL: Patient is well developed, well nourished, awake and alert.  HEENT: Head and neck are normocephalic and atraumatic.   LUNGS: Even unlabored respirations.  SKIN: Clean, dry, intact.  EXTREMITIES: No clubbing, cyanosis, edema.  MUSCULOSKELETAL: Symmetric body habitus. Spine straight. Strength intact,  NEUROLOGIC: Appropriate. No abnormal movements, good muscle tone.                              Cerebellar: station and gait steady.    Mental Status Exam:     Patient sleeping arouses and participates in exam but very difficult to keep focused.  He is rather inattentive and uncooperative.  Patient asked numerous questions to be repeated to ensure if it is from psychosis and thought disorder or inattentiveness.  He laughs and appropriate during exam.  Makes very limited eye contact.  He is unkempt and disheveled.       Hygiene:   poor  Cooperation:   Guarded, difficult to engage, " superficial, inattentive  Eye Contact:  Poor  Psychomotor Behavior:   Lying in bed calmly  Affect:  Inappropriate  Mood: Dysthymic  Speech:  Normal  Language: Lots of cursing  Thought Process:  Tangential and guarded, superficial  Thought Content:  Bizarre  Suicidal:  None  Homicidal:  None  Hallucinations:  Auditory  Delusion:  Paranoid  Memory:   Appears to be grossly intact  Orientation:  Person, Place, Time, and Situation  Reliability:  poor  Insight:   Limited  Judgement:  Impaired  Impulse Control:  Impaired        Result Review    Result Review:  I have personally reviewed the results from the time of this admission to 10/31/2023 11:07 EDT and agree with these findings:  [x]  Laboratory  []  Microbiology  []  Radiology  []  EKG/Telemetry   []  Cardiology/Vascular   []  Pathology  []  Old records  []  Other:  Most notable findings include: Positive for methamphetamine    Assessment & Plan   Assessment / Plan     Brief Patient Summary:  Jaun Addison is a 38 y.o. male who had a voluntary basis for acute psychosis as well as depression with suicidal ideations    Active Hospital Problems:  Active Hospital Problems    Diagnosis     **Severe recurrent major depression without psychotic features     Psychosis     Amphetamine abuse        Plan:   Patient states that previous medication of olanzapine and sertraline were effective and has to be put back on his medications  Unable to fully discern of the patient's psychosis and presentation are due to substance abuse psychosis versus underlying severe mental illness such as schizophrenia.  We will continue to evaluate  Make referral to drug and alcohol rehabilitation  Admit for safety and stabilization and begin treatment for underlying mood disorder or psychosis with appropriate medications  Attempt to gain collateral information of possible  Work on safety plan  Provide supportive therapy  Patient to engage in all group and individual treatment modalities available  including milieu therapy  Work on appropriate disposition follow-up  Estimated length of stay in hospital 4 to 5 days      DVT prophylaxis:  Mechanical DVT prophylaxis orders are present.    CODE STATUS:    Code Status (Patient has no pulse and is not breathing): CPR (Attempt to Resuscitate)  Medical Interventions (Patient has pulse or is breathing): Full Support      Admission Status:  I believe this patient meets inpatient status.      Part of this note may be an electronic transcription/translation of spoken language to printed text using the Dragon dictation system.        Electronically signed by Farhad Bailey MD, 10/31/23, 10:58 AM EDT.

## 2023-10-31 NOTE — CONSULTS
"Nutrition Services    Patient Name: Jaun Addison  YOB: 1985  MRN: [id number]  Admission date: 10/30/2023      CLINICAL NUTRITION ASSESSMENT      Reason for Assessment  Identified at risk by screening criteria, MST score 2+, BMI     H&P:    Past Medical History:   Diagnosis Date    Schizophrenia         Current Problems:   Active Hospital Problems    Diagnosis     **Severe recurrent major depression without psychotic features     Psychosis     Amphetamine abuse         Nutrition/Diet History         Narrative     38 year old male presented to ED for evaluation of suicide ideation  and methamphetamine abuse.  Pt reports history of schizophrenia without taking medication.  Admitted to [facility] for further psychiatric care.     Nursing notes skin in tact with a Sen Score = 22.    Pt does report problems with constipation and is agreeable to prune juice with breakfast.  No documented bowel movement since admission.    Pt reported he does not eat anything when at home and  indicates this is due to MONICA.    BMI is within normal limits.  Pt is 81% of  pounds, 15 ounces.  No wt hx available for assessment.      RD conducted NFPE with indicators consistent with moderate malnutrition related to moderate loss of muscle mass and moderate loss of fat stores.  RD provided MNT related to increased calories and protein to arrest and or reverse muscle wasting and loss of fat stores.  Pt agreeable to starting ONS to supplement diet.       Boost Breeze with breakfast, Boost with lunch and dinner.  Change diet to regular diet, high protein, regular texture, thin liquids.        Anthropometrics        Current Height, Weight Height: 175.3 cm (69.02\")  Weight: 57.6 kg (127 lb)   Current BMI Body mass index is 18.75 kg/m².       Weight Hx  Wt Readings from Last 30 Encounters:   10/30/23 1552 57.6 kg (127 lb)   10/30/23 0835 57.6 kg (127 lb)   10/29/23 2217 58 kg (127 lb 13.9 oz)            Wt Change Observation " "MADY     Estimated/Assessed Needs       Energy Requirements    EST Needs (kcal/day) 30-35 kcals/kg = 2100 - 2450 calories       Protein Requirements    EST Daily Needs (g/day) 1.5-2.0 g/kg = 105 - 140 grams of protein       Fluid Requirements     Estimated Needs (mL/day) 30 ml/kg = 2100 ml (8-9 cups)     Labs/Medications         Pertinent Labs Reviewed.   Results from last 7 days   Lab Units 10/30/23  0954 10/29/23  2202   SODIUM mmol/L 135* 135*   POTASSIUM mmol/L 4.2 3.8   CHLORIDE mmol/L 97* 96*   CO2 mmol/L 26.3 26.7   BUN mg/dL 25* 24*   CREATININE mg/dL 0.90 0.95   CALCIUM mg/dL 9.5 9.3   BILIRUBIN mg/dL 0.6 0.5   ALK PHOS U/L 119* 125*   ALT (SGPT) U/L 10 10   AST (SGOT) U/L 13 12   GLUCOSE mg/dL 90 93     Results from last 7 days   Lab Units 10/30/23  0954   HEMOGLOBIN g/dL 16.1   HEMATOCRIT % 46.6     No results found for: \"COVID19\"  No results found for: \"HGBA1C\"      Pertinent Medications Reviewed.     Current Nutrition Orders & Evaluation of Intake       Oral Nutrition     Current PO Diet Diet: Regular/House Diet, High Protein Diet; Texture: Regular Texture (IDDSI 7); Fluid Consistency: Thin (IDDSI 0)   Supplement Orders Placed This Encounter      Dietary Nutrition Supplements Boost Plus (Ensure Plus)      Dietary Nutrition Supplements Boost Breeze (Ensure Clear)       Malnutrition Severity Assessment      Patient meets criteria for : Moderate (non-severe) Malnutrition  Malnutrition Type (last 8 hours)       Malnutrition Severity Assessment       Row Name 10/31/23 1301       Malnutrition Severity Assessment    Malnutrition Type Chronic Disease - Related Malnutrition  MONICA - methamphetamine      Row Name 10/31/23 1301       Insufficient Energy Intake     Insufficient Energy Intake Findings None  Consuming AM meal well ([place] toast, eggs, sausage)      Row Name 10/31/23 1301       Unintentional Weight Loss     Unintentional Weight Loss Findings None  Unable to assess due to no wt hx      Row Name 10/31/23 " 1301       Muscle Loss    Loss of Muscle Mass Findings Moderate    [place] Moderate - slight depression    Clavicle Bone Region Moderate - some protrusion in females, visible in males    Acromion Bone Region Moderate - acromion may slightly protrude    Scapular Bone Region None    Dorsal Hand Region None    Patellar Region Moderate - patella more prominent, less muscle definition around patella    Anterior Thigh Region None    Posterior Calf Region None      Row Name 10/31/23 1301       Fat Loss    Subcutaneous Fat Loss Findings Moderate    Orbital Region  Moderate -  somewhat hollowness, slightly dark circles    Upper Arm Region Moderate - some fat tissue, not ample    Thoracic & Lumbar Region Severe - ribs visible with prominent depressions, iliac crest very prominent      Row Name 10/31/23 1301       Fluid Accumulation (Edema)    Fluid Acumulation Findings --  No upper or lower extremity edema      Row Name 10/31/23 1301       Declining Functional Status    Declining Functional Status Findings N/A      Row Name 10/31/23 1301       Criteria Met (Must meet criteria for severity in at least 2 of these categories: M Wasting, Fat Loss, Fluid, Secondary Signs, Wt. Status, Intake)    Patient meets criteria for  Moderate (non-severe) Malnutrition                       Nutrition Diagnosis         Nutrition Dx Problem 1 Moderate malnutrition related to inadequate oral Intake as evidenced by decreased appetite.       Nutrition Intervention         Boost Breeze with breakfast, Boost with lunch and dinner. regular diet, high protein, regular texture, thin liquids.     Medical Nutrition Therapy/Nutrition Education          Learner     Readiness Patient  Acceptance     Method     Response Explanation and Written Material  Needs reinforcement     Monitor/Evaluation        Monitor Per protocol, PO intake, Supplement intake, Pertinent labs, GI status, POC/GOC       Nutrition Discharge Plan         To be determined        Electronically signed by:  Anastasiia Varela RD  10/31/23 13:20 EDT

## 2023-10-31 NOTE — PLAN OF CARE
"Goal Outcome Evaluation:  Plan of Care Reviewed With: patient   Pt is able to make his needs known. Pt ask to describe his mood and began talking about a different topic. States he forgets what he is saying.Pt hesitant to take medication but did take med. States he feels paranoid constantly.States \"I need to get this shit out of my head\" but did not elaborate.Pt is oriented to place,time and date.Treatment plan reviewed and is ongoing.                  "

## 2023-10-31 NOTE — PLAN OF CARE
Goal Outcome Evaluation:    Pt is still confused and non seneschal at times, pt rambles and has poor eye contact, pt has been in bed withdrawn to room during most of shift, pt denies SI/HI and AVH. Pt was unable to rate anxiety or depression, pt contracted for safety, pt is currently participating in group. Pt has been on phone today and eating meals and medication compliant. No s/s of distress at this time.

## 2023-11-01 RX ADMIN — SERTRALINE HYDROCHLORIDE 50 MG: 50 TABLET ORAL at 09:00

## 2023-11-01 RX ADMIN — OLANZAPINE 15 MG: 5 TABLET, FILM COATED ORAL at 20:29

## 2023-11-01 NOTE — PROGRESS NOTES
" [facility]     Psychiatric Progress Note    Patient Name: Jaun Addison  : 1985  MRN: [id number]  Primary Care Physician:  Amanda Macias MD  Date of admission: 10/30/2023    Subjective   Subjective     Patient seen and chart reviewed, discussed with staff.    Chief Complaint: Depression, psychosis      HPI:     Staff reports the patient has been calm and cooperative.  Complaining of seeing images.  Denying suicidal or homicidal ideation.  Rated his depression and anxiety as a 0 with staff.    Patient today reports he feels strained.  Reports he feels like he does not want to do anything as low motivation.  Patient reports there is too much \"shit\" that he has to deal with and make sense of.  Reports that he feels life is a puzzle but he is always trying to put together.  He appears quite anxious and dysthymic during interview.  Patient then tells me that he has sadness but is not depressed.  Discussed what he had told me about his mood state and as well as feeling sad today and it appears he does have depression.  He does acknowledge an states that he is depressed.  He denies any suicidal ideation.    Reports that being back on medicines as should be beneficial as hopes to stabilize.  Reports he does feel better and he did yesterday.    Patient does exhibit some thought blocking at times.  Appears fearful.  Has some obvious anxiety.  During rounds noticed the patient lying in bed and laughing to himself inappropriately and appears to be responding to internal stimuli.    He reports being diagnosed with schizophrenia at age 20, being diagnosed with this prior to any substance or drug abuse.  States he began using methamphetamine to try to stay awake at night because of his paranoia and thinking things were to happen to him if he slept.    Patient does appear thin but has a good appetite.  He is not exhibiting any sequelae from malnutrition or being underweight.  Patient been using methamphetamine and " "suspect weight loss of being underweight secondary to substance use and should resolve if remains sober maintains proper diet.      Objective   Objective     Vitals:   Temp:  [97.7 °F (36.5 °C)] 97.7 °F (36.5 °C)  Heart Rate:  [73-74] 73  Resp:  [16] 16  BP: (114-117)/(74-84) 117/74          Mental Status Exam:      Appearance:   Bed, isolative, difficult to engage, limited eye contact, appears anxious and somewhat fretful  Reliability:   Good  Eye Contact:   Fair and rather limited  Concentration/Focus:    Attentive to the interview, gets distracted at times  Behaviors:    Laughing inappropriately, no behavioral [place]  Memory :    Intact  Speech:    Normal rate and volume  Language:   Appropriate, relevant for the most part  Mood :    \"Sad\"  Affect:    Congruent with stated mood as well as anxious  Thought process:    Exhibits thought blocking at times, tangential, somewhat superficial, delusional  Thought Content:    Denies suicidal or homicidal ideation, has active hallucinations  Insight:   Fair  Judgement:    Intact, no behavioral disturbance, compliant with treatment      Result Review    Result Review:  I have personally reviewed the results from the time of this admission to 11/1/2023 16:50 EDT and agree with these findings:  []  Laboratory  []  Microbiology  []  Radiology  []  EKG/Telemetry   []  Cardiology/Vascular   []  Pathology  []  Old records  []  Other:  Most notable findings include:     Lab Results (last 24 hours)       ** No results found for the last 24 hours. **                Medications:   OLANZapine, 10 mg, Oral, Nightly  sertraline, 50 mg, Oral, Daily          Assessment / Plan       Active Hospital Problems:  Active Hospital Problems    Diagnosis     **Severe recurrent major depression without psychotic features     Psychosis     Amphetamine abuse        Plan:     Increase olanzapine to 15 mg  Continue to work on mood stabilization  Patient states he does not feel he needs to go to drug " and alcohol rehabilitation but will continue to assess and suggest treatment for drugs and alcohol  Work on mood stabilization and abatement of any suicidal ideation or psychosis.  Work on appropriate safety plan  Continue supportive therapy  Patient to engage in all group and individual treatment modalities available on the unit  Obtain collateral information if possible  Titrate medications as clinically indicated  Work on appropriate disposition follow-up including referrals to substance abuse treatment if indicated      Disposition:  I expect patient to be discharged 2 to 3 days.    Part of this note may be an electronic transcription/translation of spoken language to printed text using the Dragon dictation system.         Electronically signed by Farhad Bailey MD, 11/01/23, 4:50 PM EDT.

## 2023-11-01 NOTE — PLAN OF CARE
"Goal Outcome Evaluation:  Plan of Care Reviewed With: patient  Patient Agreement with Plan of Care: agrees   Pt withdrawn to room, restless, thought broadcasting, loose associations present. Pt cooperative, but suspicious. Disoriented to time and situation. Pt denies SI, HI, AVH, anxiety and depression. Pt reported having a conversation with roommate and being confused but couldn't give details. MHT reported overhearing roommate telling pt about wanting to \"break out\" of the unit. Pt med compliant.                "

## 2023-11-02 RX ADMIN — OLANZAPINE 15 MG: 5 TABLET, FILM COATED ORAL at 20:09

## 2023-11-02 RX ADMIN — NICOTINE 1 PATCH: 21 PATCH, EXTENDED RELEASE TRANSDERMAL at 16:46

## 2023-11-02 RX ADMIN — SERTRALINE HYDROCHLORIDE 50 MG: 50 TABLET ORAL at 10:12

## 2023-11-02 NOTE — PROGRESS NOTES
" [facility]     Psychiatric Progress Note    Patient Name: Jaun Addison  : 1985  MRN: [id number]  Primary Care Physician:  Amanda Macias MD  Date of admission: 10/30/2023    Subjective   Subjective     Patient seen and chart reviewed, discussed with staff.    Chief Complaint: Psychosis, depression      HPI:     Staff reports the patient continues to be rather bizarre.  He is withdrawn isolative to his room.  Reported to be sad and appears depressed.  Denying suicidal or homicidal ideation.  He is noted to have slept well.    Patient day is isolative to his room.  Patient states that he is \"coming back to reality\" and when asked to describe what he means he is unable to give a full explanation.  He has a hard time verbalizing what he is thinking and often breaks his sentences off midstream and appears to have some thought blocking.  Reports feeling scared to death but cannot tell me what he is fearful of or why he is scared.    Does acknowledge that he feels better.  Had increased her medications yesterday and will continue this dose and monitor need to titrate further.    Discussed discharge plan and the patient seems very unclear of discharge disposition but states he would like to go to his father's house.      Objective   Objective     Vitals:   Temp:  [98.1 °F (36.7 °C)] 98.1 °F (36.7 °C)  Heart Rate:  [67-70] 70  Resp:  [19-20] 19  BP: ()/(66-75) 128/75          Mental Status Exam:      Appearance:   A bed, isolative to his room, slightly disheveled, somewhat inattentive and makes limited eye contact  Reliability:   Fair  Eye Contact:   Limited  Concentration/Focus:    Attentive  Behaviors:    Regressed and isolative to his room  Memory :    Intact  Speech:    Minimal, normal rate and volume  Language:   Appropriate, relevant  Mood :    \"Scared to death\"  Affect:    Constricted, somewhat bizarre  Thought process:    Appears to have some thought blocking, tangential, slight " irritability  Thought Content:    Denies suicidal or homicidal ideation, no hallucinations  Insight:   Fair  Judgement:    Intact, no behavioral start      Result Review    Result Review:  I have personally reviewed the results from the time of this admission to 11/2/2023 10:50 EDT and agree with these findings:  []  Laboratory  []  Microbiology  []  Radiology  []  EKG/Telemetry   []  Cardiology/Vascular   []  Pathology  []  Old records  []  Other:  Most notable findings include:     Lab Results (last 24 hours)       ** No results found for the last 24 hours. **                Medications:   OLANZapine, 15 mg, Oral, Nightly  sertraline, 50 mg, Oral, Daily          Assessment / Plan       Active Hospital Problems:  Active Hospital Problems    Diagnosis     **Severe recurrent major depression without psychotic features     Psychosis     Amphetamine abuse        Plan:     Continue current treatment protocol and titrate medications as clinically indicated  Work on mood stabilization and abatement of any suicidal ideation or psychosis.  Work on appropriate safety plan  Continue supportive therapy  Patient to engage in all group and individual treatment modalities available on the unit  Obtain collateral information if possible  Titrate medications as clinically indicated  Work on appropriate disposition follow-up including referrals to substance abuse treatment if indicated      Disposition:  I expect patient to be discharged 2 to 3 days.    Part of this note may be an electronic transcription/translation of spoken language to printed text using the Dragon dictation system.         Electronically signed by Farhad Bailey MD, 11/02/23, 10:50 AM EDT.

## 2023-11-02 NOTE — PLAN OF CARE
"Goal Outcome Evaluation:  Plan of Care Reviewed With: patient  Patient Agreement with Plan of Care: agrees   Pt withdrawn to room, lays in bed resting. Restless with assessment, fidgets and covers face. Pt labile during assessment near tears and then laughing. Pt stated, \"I'm just depressed. Sad. Don't feel like I fit in, and no I don't. I want to go back to childhood and see how I messed up.\" Pt endorsed anxiety and depression but was unable to rate, stating, \"I can't put a number to it.\" Pt feels like he has no support. Pt thought blocks, repeats statements and then answers questions. Pt has slow speech, and quiet when speaking. Pt denied SI, HI, and AVH.               "

## 2023-11-02 NOTE — PLAN OF CARE
Goal Outcome Evaluation:  Plan of Care Reviewed With: patient  Patient Agreement with Plan of Care: agrees  PATIENT ALERT AND ORIENTED AND COMPLIANT WITH MEDICATIONS. PATIENT HAS BEEN WITHDRAWN TO ROOM LYING IN BED FOR MAJORITY OF SHIFT. PATIENT DENIES S/I, H/I OR HALLUCINATIONS BUT OBSERVED BY STAFF TALKING TO HIMSELF IN HIS ROOM. NO INAPPROPRIATE OR AGGRESSIVE BEHAVIOR NOTED. WILL CONTINUE TO MONITOR FOR CHANGES IN MOOD OR BEHAVIOR.

## 2023-11-03 VITALS
RESPIRATION RATE: 16 BRPM | OXYGEN SATURATION: 100 % | WEIGHT: 127 LBS | BODY MASS INDEX: 18.81 KG/M2 | SYSTOLIC BLOOD PRESSURE: 133 MMHG | HEIGHT: 69 IN | TEMPERATURE: 97.5 F | HEART RATE: 82 BPM | DIASTOLIC BLOOD PRESSURE: 77 MMHG

## 2023-11-03 RX ORDER — OLANZAPINE 15 MG/1
15 TABLET ORAL NIGHTLY
Qty: 30 TABLET | Refills: 1 | Status: SHIPPED | OUTPATIENT
Start: 2023-11-03

## 2023-11-03 RX ADMIN — SERTRALINE HYDROCHLORIDE 50 MG: 50 TABLET ORAL at 08:38

## 2023-11-03 NOTE — DISCHARGE SUMMARY
[facility]         DISCHARGE SUMMARY    Patient Name: Jaun Addison  : 1985  MRN: [id number]    Date of Admission: 10/30/2023  Date of Discharge: 11/3/2023  Primary Care Physician: Amanda Macias MD    Consults       No orders found from 10/1/2023 to 10/31/2023.            Presenting Problem:   Severe recurrent major depression without psychotic features [F33.2]    Active and Resolved Hospital Problems:  Active Hospital Problems    Diagnosis POA    **Severe recurrent major depression without psychotic features [F33.2] Yes    Psychosis [F29] Yes    Amphetamine abuse [F15.10] Yes      Resolved Hospital Problems   No resolved problems to display.         Hospital Course     Hospital Course:  Jaun Addison is a 38 y.o. male with a history of psychotic disorder admitted on a voluntary basis after self-referral in the emergency room.  Patient was seen in the emergency room and discharged home but returned a few hours later with suicidal ideations.  Patient also has been positive for methamphetamine.    Initially patient was very difficult to engage.  He was in bed.  Very suspicious and paranoid.  The patient was actively hallucinating.  Patient easily distracted during interview, exhibited thought blocking, was noted to be laughing to himself early in his hospitalization.  He reports he been off medications for a number of weeks and months.  He reports his previous medications of olanzapine and sertraline were very effective.      Olanzapine was initiated at 10 mg and this was titrated to 50 mg during his stay.  He tolerated this medication well with no side effects.  His affect improved, the psychosis began to kassi.  Patient went from initially being very disorganized and difficult to engage to being much more communicative and had goal directed thinking.  He was also up and out in the milieu in the last 1 to 2 days of his hospitalization instead of being isolative and regressed to his  "room.    He did not his depression has been denying suicidal ideations.  He was started on sertraline 50 mg daily.  He is tolerating his medication well.  Mood and affect have improved.    Patient showed significant improvement.  Patient does have a long history of substance use and was positive for methamphetamine when he came into the hospital.  Patient states that he has not done well in rehabs in the past and does not think that he can tolerate it.  States he was living with his father and grandmother he would like to return to their home.  Patient was requesting discharge at this point does not meet criteria for involuntary stay.  He is improving back on medications and can be managed as an outpatient.    On day of discharge he is calm, cooperative, engaging, and makes good eye contact.  There is no psychomotor restlessness or agitation.  He is appropriate with peers and staff and not been out in the milieu.  He is future oriented goal directed.  Speech is articulate, fluent, normal rate and volume.  Language is appropriate relevant.  He continues to make limited eye contact.  Mood is described as \"excited and alive\" and he has a brighter affect than time of admission but remains constricted.  Thought processes are goal directed with occasional thought blocking and some slight tangentiality but significantly improved.  Thought content is negative for suicidal or homicidal ideation and he is denying any hallucinations today and there is no evidence of acute hallucinations during the interview.  Insight and judgment are improved      DISCHARGE Follow Up Recommendations for labs and diagnostics: Lipid and glucose monitoring, routine health exam from primary care, community mental health, drug and alcohol rehabilitation      Day of Discharge     Vital Signs:  Temp:  [97.3 °F (36.3 °C)-98.1 °F (36.7 °C)] 97.5 °F (36.4 °C)  Heart Rate:  [72-82] 82  Resp:  [16-18] 16  BP: (115-133)/(72-80) 133/77      Pertinent  " and/or Most Recent Results     LAB RESULTS:      Lab 10/30/23  0954 10/29/23  2202   WBC 10.37 11.41*   HEMOGLOBIN 16.1 16.1   HEMATOCRIT 46.6 47.1   PLATELETS 369 381   NEUTROS ABS 6.65 6.39   IMMATURE GRANS (ABS) 0.05 0.05   LYMPHS ABS 2.58 3.71*   MONOS ABS 0.84 0.93*   EOS ABS 0.15 0.20   MCV 82.6 82.3         Lab 10/30/23  0954 10/29/23  2202   SODIUM 135* 135*   POTASSIUM 4.2 3.8   CHLORIDE 97* 96*   CO2 26.3 26.7   ANION GAP 11.7 12.3   BUN 25* 24*   CREATININE 0.90 0.95   EGFR 113.5 106.4   GLUCOSE 90 93   CALCIUM 9.5 9.3         Lab 10/30/23  0954 10/29/23  2202   TOTAL PROTEIN 7.4 7.6   ALBUMIN 4.8 4.8   GLOBULIN 2.6 2.8   ALT (SGPT) 10 10   AST (SGOT) 13 12   BILIRUBIN 0.6 0.5   ALK PHOS 119* 125*                                     Lab 10/30/23  0954   ETHANOL PCT <0.010   ETHANOL MGDL <10         Lab 10/30/23  0954 10/29/23  2256   AMPH/METHAM SCREEN, URINE Positive* Positive*   BENZODIAZEPINE SCREEN, URINE Negative Negative   COCAINE SCREEN, URINE Negative Negative   OPIATES Negative Negative   THC URINE SCREEN Negative Negative   METHADONE SCREEN, URINE Negative Negative     Brief Urine Lab Results       None                                Imaging Results (Last 7 Days)       ** No results found for the last 168 hours. **             Labs Pending at Discharge:           Discharge Details        Discharge Medications        Changes to Medications        Instructions Start Date   OLANZapine 15 MG tablet  Commonly known as: zyPREXA  What changed:   medication strength  how much to take   15 mg, Oral, Nightly      sertraline 50 MG tablet  Commonly known as: ZOLOFT  What changed:   medication strength  how much to take   50 mg, Oral, Daily   Start Date: November 4, 2023              No Known Allergies      Discharge Disposition:  Home or Self Care    Diet:  Hospital:  Diet Order   Procedures    Diet: Regular/House Diet, High Protein Diet; Texture: Regular Texture (IDDSI 7); Fluid Consistency: Thin (IDDSI 0)          Discharge Activity: Ad valentina.  Activity Instructions       Activity as Tolerated              Discharge Condition: Stable    CODE STATUS:  Code Status and Medical Interventions:   Ordered at: 10/30/23 1309     Code Status (Patient has no pulse and is not breathing):    CPR (Attempt to Resuscitate)     Medical Interventions (Patient has pulse or is breathing):    Full Support         No future appointments.    Additional Instructions for the Follow-ups that You Need to Schedule       Discharge Follow-up with PCP   As directed       Currently Documented PCP:    Amanda Macias MD    PCP Phone Number:    [phone]     Follow Up Details: As needed        Discharge Follow-up with Specified Provider: [facility]   As directed      To: [facility]        Discharge Follow-up with Specified Provider: Drug abstinence and rehabilitation   As directed      To: [facility] rehabilitation                Time spent on Discharge including face to face service: 30 minutes    Part of this note may be an electronic transcription/translation of spoken language to printed text using the Dragon dictation system.        Electronically signed by Farhad Bailey MD, 11/03/23, 12:11 PM EDT.

## 2023-11-03 NOTE — PLAN OF CARE
Goal Outcome Evaluation:  Plan of Care Reviewed With: patient  Patient Agreement with Plan of Care: agrees   Pt started shift withdrawn to room, laying in bed. Pt med compliant, up for snack and then went to dayroom to interact with peers. During assessment, pt stated that he is having a hard time concentrating and focused on past mistakes and how it effected others. Pt stated that he is having trouble discerning reality, thinking things that he knows are not true. Rated anxiety 5, endorsed depression unable to rate on scale. Denied SI, HI, AVH.

## 2024-10-21 ENCOUNTER — HOSPITAL ENCOUNTER (EMERGENCY)
Facility: HOSPITAL | Age: 39
Discharge: PSYCHIATRIC OR SUBSTANCE USE FACILITY | DRG: 885 | End: 2024-10-22
Attending: EMERGENCY MEDICINE | Admitting: EMERGENCY MEDICINE
Payer: MEDICARE

## 2024-10-21 DIAGNOSIS — F22 PARANOID DELUSION: Primary | ICD-10-CM

## 2024-10-21 DIAGNOSIS — F20.9 SCHIZOPHRENIA, UNSPECIFIED TYPE: ICD-10-CM

## 2024-10-21 LAB
ALBUMIN SERPL-MCNC: 4.6 G/DL (ref 3.5–5.2)
ALBUMIN/GLOB SERPL: 1.8 G/DL
ALP SERPL-CCNC: 97 U/L (ref 39–117)
ALT SERPL W P-5'-P-CCNC: 9 U/L (ref 1–41)
AMPHET+METHAMPHET UR QL: NEGATIVE
AMPHETAMINES UR QL: NEGATIVE
ANION GAP SERPL CALCULATED.3IONS-SCNC: 10 MMOL/L (ref 5–15)
APAP SERPL-MCNC: <5 MCG/ML (ref 0–30)
AST SERPL-CCNC: 14 U/L (ref 1–40)
BARBITURATES UR QL SCN: NEGATIVE
BASOPHILS # BLD AUTO: 0.13 10*3/MM3 (ref 0–0.2)
BASOPHILS NFR BLD AUTO: 0.8 % (ref 0–1.5)
BENZODIAZ UR QL SCN: NEGATIVE
BILIRUB SERPL-MCNC: 0.4 MG/DL (ref 0–1.2)
BUN SERPL-MCNC: 14 MG/DL (ref 6–20)
BUN/CREAT SERPL: 13.9 (ref 7–25)
BUPRENORPHINE SERPL-MCNC: NEGATIVE NG/ML
CALCIUM SPEC-SCNC: 9.5 MG/DL (ref 8.6–10.5)
CANNABINOIDS SERPL QL: NEGATIVE
CHLORIDE SERPL-SCNC: 102 MMOL/L (ref 98–107)
CO2 SERPL-SCNC: 28 MMOL/L (ref 22–29)
COCAINE UR QL: NEGATIVE
CREAT SERPL-MCNC: 1.01 MG/DL (ref 0.76–1.27)
DEPRECATED RDW RBC AUTO: 37.8 FL (ref 37–54)
EGFRCR SERPLBLD CKD-EPI 2021: 98.2 ML/MIN/1.73
EOSINOPHIL # BLD AUTO: 0.18 10*3/MM3 (ref 0–0.4)
EOSINOPHIL NFR BLD AUTO: 1.1 % (ref 0.3–6.2)
ERYTHROCYTE [DISTWIDTH] IN BLOOD BY AUTOMATED COUNT: 12.4 % (ref 12.3–15.4)
ETHANOL BLD-MCNC: <10 MG/DL (ref 0–10)
ETHANOL UR QL: <0.01 %
FENTANYL UR-MCNC: NEGATIVE NG/ML
GLOBULIN UR ELPH-MCNC: 2.6 GM/DL
GLUCOSE SERPL-MCNC: 99 MG/DL (ref 65–99)
HCT VFR BLD AUTO: 43.8 % (ref 37.5–51)
HGB BLD-MCNC: 15 G/DL (ref 13–17.7)
HOLD SPECIMEN: NORMAL
HOLD SPECIMEN: NORMAL
IMM GRANULOCYTES # BLD AUTO: 0.05 10*3/MM3 (ref 0–0.05)
IMM GRANULOCYTES NFR BLD AUTO: 0.3 % (ref 0–0.5)
LYMPHOCYTES # BLD AUTO: 3.57 10*3/MM3 (ref 0.7–3.1)
LYMPHOCYTES NFR BLD AUTO: 22.5 % (ref 19.6–45.3)
MCH RBC QN AUTO: 28.5 PG (ref 26.6–33)
MCHC RBC AUTO-ENTMCNC: 34.2 G/DL (ref 31.5–35.7)
MCV RBC AUTO: 83.3 FL (ref 79–97)
METHADONE UR QL SCN: NEGATIVE
MONOCYTES # BLD AUTO: 1.34 10*3/MM3 (ref 0.1–0.9)
MONOCYTES NFR BLD AUTO: 8.5 % (ref 5–12)
NEUTROPHILS NFR BLD AUTO: 10.58 10*3/MM3 (ref 1.7–7)
NEUTROPHILS NFR BLD AUTO: 66.8 % (ref 42.7–76)
NRBC BLD AUTO-RTO: 0 /100 WBC (ref 0–0.2)
OPIATES UR QL: NEGATIVE
OXYCODONE UR QL SCN: NEGATIVE
PCP UR QL SCN: NEGATIVE
PLATELET # BLD AUTO: 375 10*3/MM3 (ref 140–450)
PMV BLD AUTO: 9.8 FL (ref 6–12)
POTASSIUM SERPL-SCNC: 4.2 MMOL/L (ref 3.5–5.2)
PROT SERPL-MCNC: 7.2 G/DL (ref 6–8.5)
RBC # BLD AUTO: 5.26 10*6/MM3 (ref 4.14–5.8)
SALICYLATES SERPL-MCNC: <0.3 MG/DL
SODIUM SERPL-SCNC: 140 MMOL/L (ref 136–145)
T4 FREE SERPL-MCNC: 1.83 NG/DL (ref 0.92–1.68)
TRICYCLICS UR QL SCN: NEGATIVE
TSH SERPL DL<=0.05 MIU/L-ACNC: 1.07 UIU/ML (ref 0.27–4.2)
WBC NRBC COR # BLD AUTO: 15.85 10*3/MM3 (ref 3.4–10.8)
WHOLE BLOOD HOLD COAG: NORMAL
WHOLE BLOOD HOLD SPECIMEN: NORMAL

## 2024-10-21 PROCEDURE — 80179 DRUG ASSAY SALICYLATE: CPT | Performed by: EMERGENCY MEDICINE

## 2024-10-21 PROCEDURE — 80307 DRUG TEST PRSMV CHEM ANLYZR: CPT | Performed by: EMERGENCY MEDICINE

## 2024-10-21 PROCEDURE — 36415 COLL VENOUS BLD VENIPUNCTURE: CPT

## 2024-10-21 PROCEDURE — 85025 COMPLETE CBC W/AUTO DIFF WBC: CPT | Performed by: EMERGENCY MEDICINE

## 2024-10-21 PROCEDURE — 99285 EMERGENCY DEPT VISIT HI MDM: CPT

## 2024-10-21 PROCEDURE — 84439 ASSAY OF FREE THYROXINE: CPT | Performed by: NURSE PRACTITIONER

## 2024-10-21 PROCEDURE — 84443 ASSAY THYROID STIM HORMONE: CPT | Performed by: EMERGENCY MEDICINE

## 2024-10-21 PROCEDURE — 80143 DRUG ASSAY ACETAMINOPHEN: CPT | Performed by: EMERGENCY MEDICINE

## 2024-10-21 PROCEDURE — 80053 COMPREHEN METABOLIC PANEL: CPT | Performed by: EMERGENCY MEDICINE

## 2024-10-21 PROCEDURE — 82077 ASSAY SPEC XCP UR&BREATH IA: CPT | Performed by: EMERGENCY MEDICINE

## 2024-10-21 RX ORDER — SODIUM CHLORIDE 0.9 % (FLUSH) 0.9 %
10 SYRINGE (ML) INJECTION AS NEEDED
Status: DISCONTINUED | OUTPATIENT
Start: 2024-10-21 | End: 2024-10-22 | Stop reason: HOSPADM

## 2024-10-22 ENCOUNTER — HOSPITAL ENCOUNTER (INPATIENT)
Facility: HOSPITAL | Age: 39
LOS: 3 days | Discharge: HOME | End: 2024-10-25
Attending: PSYCHIATRY & NEUROLOGY | Admitting: PSYCHIATRY & NEUROLOGY
Payer: MEDICARE

## 2024-10-22 VITALS
DIASTOLIC BLOOD PRESSURE: 72 MMHG | BODY MASS INDEX: 19.89 KG/M2 | TEMPERATURE: 98.1 F | RESPIRATION RATE: 16 BRPM | HEIGHT: 67 IN | SYSTOLIC BLOOD PRESSURE: 108 MMHG | HEART RATE: 66 BPM | OXYGEN SATURATION: 95 %

## 2024-10-22 PROBLEM — F31.9 BIPOLAR DISORDER: Status: ACTIVE | Noted: 2024-10-22

## 2024-10-22 PROBLEM — D72.829 LEUKOCYTOSIS: Status: ACTIVE | Noted: 2024-10-22

## 2024-10-22 RX ORDER — LOPERAMIDE HCL 2 MG
2 CAPSULE ORAL
Status: DISCONTINUED | OUTPATIENT
Start: 2024-10-22 | End: 2024-10-25 | Stop reason: HOSPADM

## 2024-10-22 RX ORDER — OLANZAPINE 5 MG/1
5 TABLET ORAL NIGHTLY
COMMUNITY
End: 2024-10-25 | Stop reason: HOSPADM

## 2024-10-22 RX ORDER — HALOPERIDOL 5 MG/ML
5 INJECTION INTRAMUSCULAR EVERY 4 HOURS PRN
Status: DISCONTINUED | OUTPATIENT
Start: 2024-10-22 | End: 2024-10-25 | Stop reason: HOSPADM

## 2024-10-22 RX ORDER — HALOPERIDOL 5 MG/1
5 TABLET ORAL EVERY 4 HOURS PRN
Status: DISCONTINUED | OUTPATIENT
Start: 2024-10-22 | End: 2024-10-25 | Stop reason: HOSPADM

## 2024-10-22 RX ORDER — OLANZAPINE 20 MG/1
20 TABLET ORAL NIGHTLY
COMMUNITY
Start: 2024-09-06 | End: 2024-10-25 | Stop reason: HOSPADM

## 2024-10-22 RX ORDER — DIPHENHYDRAMINE HYDROCHLORIDE 50 MG/ML
50 INJECTION INTRAMUSCULAR; INTRAVENOUS EVERY 4 HOURS PRN
Status: DISCONTINUED | OUTPATIENT
Start: 2024-10-22 | End: 2024-10-25 | Stop reason: HOSPADM

## 2024-10-22 RX ORDER — HYDROXYZINE HYDROCHLORIDE 25 MG/1
50 TABLET, FILM COATED ORAL EVERY 6 HOURS PRN
Status: DISCONTINUED | OUTPATIENT
Start: 2024-10-22 | End: 2024-10-25 | Stop reason: HOSPADM

## 2024-10-22 RX ORDER — OLANZAPINE 10 MG/1
10 TABLET ORAL ONCE
Status: COMPLETED | OUTPATIENT
Start: 2024-10-22 | End: 2024-10-22

## 2024-10-22 RX ORDER — ACETAMINOPHEN 325 MG/1
650 TABLET ORAL EVERY 6 HOURS PRN
Status: DISCONTINUED | OUTPATIENT
Start: 2024-10-22 | End: 2024-10-25 | Stop reason: HOSPADM

## 2024-10-22 RX ORDER — LORAZEPAM 2 MG/1
2 TABLET ORAL EVERY 4 HOURS PRN
Status: DISCONTINUED | OUTPATIENT
Start: 2024-10-22 | End: 2024-10-25 | Stop reason: HOSPADM

## 2024-10-22 RX ORDER — LORAZEPAM 2 MG/ML
2 INJECTION INTRAMUSCULAR EVERY 4 HOURS PRN
Status: DISCONTINUED | OUTPATIENT
Start: 2024-10-22 | End: 2024-10-25 | Stop reason: HOSPADM

## 2024-10-22 RX ORDER — OLANZAPINE 10 MG/1
20 TABLET ORAL NIGHTLY
Status: DISCONTINUED | OUTPATIENT
Start: 2024-10-22 | End: 2024-10-25 | Stop reason: HOSPADM

## 2024-10-22 RX ORDER — SERTRALINE HYDROCHLORIDE 100 MG/1
100 TABLET, FILM COATED ORAL DAILY
Status: ON HOLD | COMMUNITY
End: 2024-10-22

## 2024-10-22 RX ORDER — BENZTROPINE MESYLATE 1 MG/1
1 TABLET ORAL 2 TIMES DAILY
Status: ON HOLD | COMMUNITY
End: 2024-10-22

## 2024-10-22 RX ORDER — NICOTINE 21 MG/24HR
1 PATCH, TRANSDERMAL 24 HOURS TRANSDERMAL DAILY PRN
Status: DISCONTINUED | OUTPATIENT
Start: 2024-10-22 | End: 2024-10-25 | Stop reason: HOSPADM

## 2024-10-22 RX ORDER — TRAZODONE HYDROCHLORIDE 100 MG/1
100 TABLET ORAL NIGHTLY PRN
Status: DISCONTINUED | OUTPATIENT
Start: 2024-10-22 | End: 2024-10-25 | Stop reason: HOSPADM

## 2024-10-22 RX ORDER — DIPHENHYDRAMINE HCL 50 MG
50 CAPSULE ORAL EVERY 4 HOURS PRN
Status: DISCONTINUED | OUTPATIENT
Start: 2024-10-22 | End: 2024-10-25 | Stop reason: HOSPADM

## 2024-10-22 RX ORDER — ALUMINA, MAGNESIA, AND SIMETHICONE 2400; 2400; 240 MG/30ML; MG/30ML; MG/30ML
15 SUSPENSION ORAL EVERY 6 HOURS PRN
Status: DISCONTINUED | OUTPATIENT
Start: 2024-10-22 | End: 2024-10-25 | Stop reason: HOSPADM

## 2024-10-22 RX ORDER — BENZTROPINE MESYLATE 1 MG/1
1 TABLET ORAL EVERY 12 HOURS SCHEDULED
Status: ON HOLD | COMMUNITY
Start: 2024-09-06 | End: 2024-10-22

## 2024-10-22 RX ADMIN — OLANZAPINE 10 MG: 10 TABLET, FILM COATED ORAL at 12:32

## 2024-10-22 RX ADMIN — ACETAMINOPHEN 650 MG: 325 TABLET ORAL at 03:07

## 2024-10-22 RX ADMIN — OLANZAPINE 20 MG: 10 TABLET, FILM COATED ORAL at 20:43

## 2024-10-22 NOTE — H&P
"[facility] [place]   PSYCHIATRIC  HISTORY AND PHYSICAL    Patient Name: Jaun Addison  : 1985  MRN: [id number]  Primary Care Physician:  Amanda Macias MD  Date of admission: 10/22/2024    Subjective   Subjective     Chief Complaint: \"My dad called the  on me, and they brought me here\"    HPI:     Jaun Addison is a 39 y.o. male with a history of psychosis that is admitted on a voluntary basis.  States he has been off of his meds for some time.  Not exactly know how long has been off of his medicines.    Does not know why police were called.  The patient talks about people putting stuff in his food.  He also talks about having plastic containers that have cigarette butts and then that he does not understand.  He is guarded and superficial.  When asked about hallucinations he states, \"lots of stuff bothers me\" and does not address the question.  Does appear to be hallucinating.  He is guarded and superficial.  He is experiencing thought blocking and does not finish his thoughts.    States that he does not believe him.  Talks about being followed by people and it is very distressing.  He is paranoid and delusional.  He gets tearful discussing how people are following him and how upsetting it is.    Does not answer questions about suicidal thoughts but talks about being nervous because of how tired he is of being followed.  Again begins getting tearful and has an obvious level of distress.  His mood has been up and down.    Patient states he has not done drugs in some time but cannot give me an exact timeframe.  Toxicology screen was negative.  Last positive toxicology screen was 1 year ago in 2023.    Obvious distress.  Tearful, upset, delusional, and paranoid          Review of Systems:      CONSTITUTIONAL: No complaints  PSYCHIATRIC: As documented in HPI    Personal History     Past Medical History:   Diagnosis Date    Bipolar disorder     Depression     Schizophrenia        History reviewed. No " pertinent surgical history.    Past Psychiatric History: Currently under the care of [facility] for therapy and sees advanced behavioral health for medication management.      Psychiatric Hospitalizations: Second hospitalization here.  He reports a history of 3-4 admissions    Suicide Attempts: No history of suicide attempt    Prior Treatment and Medications Tried: Most recently has been on olanzapine, sertraline, benztropine, and sertraline      Family History: family history includes Alcohol abuse in his father. Otherwise pertinent FHx was reviewed and not pertinent to current issue.    Family Psych History:None known to patient      Family Suicide History:None known to patient      Social History:     [place]  Currently lives with his father.  Never  and has no children.  Ninth grade education.  He is on disability.    No  service    Had previously reported a possible history of abuse    Social History     Socioeconomic History    Marital status: Single    Years of education: No GED    Highest education level: 9th grade   Tobacco Use    Smoking status: Every Day     Current packs/day: 0.50     Types: Cigarettes   Vaping Use    Vaping status: Never Used   Substance and Sexual Activity    Alcohol use: Not Currently    Drug use: Yes     Types: Methamphetamines     Comment: Pt stated he has been through rehab and sober for 3 months.    Sexual activity: Defer       Substance Abuse History: reports that he has been smoking cigarettes. He does not have any smokeless tobacco history on file. He reports that he does not currently use alcohol. He reports current drug use. Drug: Methamphetamines.    Home Medications:   OLANZapine, benztropine, and sertraline      Allergies:  No Known Allergies    Objective   Objective     Vitals:   Temp:  [97.7 °F (36.5 °C)-98.5 °F (36.9 °C)] 98.5 °F (36.9 °C)  Heart Rate:  [66-96] 96  Resp:  [14-20] 16  BP: ()/(72-85) 118/79    Physical Exam:       CONSTITUTIONAL: Patient is well developed, well nourished, awake and alert.  HEENT: Head and neck are normocephalic and atraumatic.   LUNGS: Even unlabored respirations.  SKIN: Clean, dry, intact.  EXTREMITIES: No clubbing, cyanosis, edema.  MUSCULOSKELETAL: Symmetric body habitus. Spine straight. Strength intact,  NEUROLOGIC: Appropriate. No abnormal movements, good muscle tone.                              Cerebellar: station and gait steady.  Cranial Nerves:  CN II: Visual fields without deficit.  CN III: Pupils symmetric.  CN III, IV, VI:  Extraocular eye muscles intact, no nystagmus.  CN V: Jaw open and closing normal.  CN VII: Frown and smile symmetric.  CN VIII: Hearing intact.  CN IX, X: Normal; phonation without hoarseness.  CN XI: Shoulder shrug equal.  CN XII:  no dysarthria.        Mental Status Exam:     Awake, alert, oriented male appears appropriate stated age.  Makes limited eye contact.  Appears to be in some slight distress and is tearful and somewhat distraught.  Has some anxiety       Hygiene:   fair  Cooperation:  Cooperative  Eye Contact:  Poor  Psychomotor Behavior:  Appropriate  Affect:  Restricted, Blunted, and tearful  Mood: Dysthymic  Speech:  Normal  Language: Appropriate   Thought Process:  Tangential and guarded, unable to finish his thoughts and has some thought blocking  Thought Content:  Bizarre  Suicidal:   Either denies or endorses but obviously dysphoric and states he can no longer put up with what he is going through  Homicidal:  None  Hallucinations:  Auditory  Delusion:  Paranoid  Memory:  Intact  Orientation:  Person, Place, Time, and Situation  Reliability:  fair  Insight:  Fair  Judgement:  Fair  Impulse Control:  Fair        Result Review    Result Review:  I have personally reviewed the results from the time of this admission to 10/22/2024 11:45 EDT and agree with these findings:  [x]  Laboratory  []  Microbiology  []  Radiology  []  EKG/Telemetry   []   Cardiology/Vascular   []  Pathology  []  Old records  []  Other:  Most notable findings include: Leukocytosis    Assessment & Plan   Assessment / Plan     Brief Patient Summary:  Jaun Addison is a 39 y.o. male who made a voluntary basis for psychosis.  Reports he is done well on olanzapine in the past and will continue olanzapine    Active Hospital Problems:  Active Hospital Problems    Diagnosis     **Bipolar disorder     Leukocytosis     Psychosis        Plan:   Olanzapine 20 mg at bedtime  Patient somewhat distraught and anxious during interview and is agreeable to a one-time dose of olanzapine now  Wanted to gain collateral information from father to try and understand if he has been kicked out of the home or if he has a disposition to return to discharge  Admit for safety and stabilization and placed on appropriate precautions.  Begin treatment for underlying mood disorder or psychosis with appropriate medications.  Treatment team to assess and implement appropriate treatment plan for the patient's care.  Attempt to gain collateral information of possible  Work on safety plan  Provide supportive therapy  Patient to engage in all group and individual treatment modalities available including milieu therapy  Work on appropriate disposition follow-up  Estimated length of stay in hospital 4 to 5 days      VTE Prophylaxis:  Mechanical VTE prophylaxis orders are present.        CODE STATUS:    Code Status (Patient has no pulse and is not breathing): CPR (Attempt to Resuscitate)  Medical Interventions (Patient has pulse or is breathing): Full Support      Admission Status:  I believe this patient meets inpatient status.      Part of this note may be an electronic transcription/translation of spoken language to printed text using the Dragon dictation system.        Electronically signed by Farhad Bailey MD, 10/22/24, 11:45 AM EDT.

## 2024-10-22 NOTE — NURSING NOTE
"Voluntary admit from ED, dx Bipolar, arrived via w/c with security and ED staff accompanying him. Pt safety search completed by male security. No known allergies. Per chart, seen in ED for paranoia, hx of schizophrenia, and being off medications. Last admission to  on 10/30/2023. Brought into ED by EMS, after throwing vase and it bounced back hitting himself in face and busted lip. Hx of anger issues, wanted lip evaluated. Hx of violent outburst. Paranoia over thinking people are out to get him, food being messed with. While in ED, per Sarah MORTENSEN report, pt paranoid over blood draw and got upset that the nurse wasn't using alcohol prep.   Father called , told to get off property, no longer aloud to live there. Elevated WBC 15 +, elevated Free T4. UDS negative. Hx of meth use.   Pt has moments of irritability, labile, response lag, reports hearing voices and seeing things but refused to elaborate. Pt answers questions, cooperative with assessment. Pt at times stutters and gets preoccupied with other information. Pt does not hold concentration well. Pt stated that he came here because he gets upset and throws things, and this time his dad called the . Pt stated he is mentally disabled, now homeless. Pt stated that he completed rehab and has been sober for 3 months. Pt reported noncompliance with medications but had been taking zoloft, zyprexa, and cogentin in past. Pt cannot recall last time he took medications but thought it may have been back in January. Pt has laceration above his lip where as he described, \"I threw something and hit the wall and then hit me.\" Pt denies drug or etoh use. Rated anxiety 5, depression 9; denied SI, HI. Pt stated that he wants to get back on medication.   "

## 2024-10-22 NOTE — PLAN OF CARE
"Goal Outcome Evaluation:  Plan of Care Reviewed With: patient  Plan of Care Reviewed With: patient  Patient Agreement with Plan of Care: agrees     Progress: no change    Patient has been in bed most of day, briefly coming out to dayroom, pt denies SI/HI and AVH, pt contracted for safety, pt rated anxiety 8/10 and depression  9/10, pt states \"privacy\" is what is making him anxious and depressed, pt admits he has argument with parents and he has no place to go and cannot return home. Pt has been withdrawn to room,pt is medication compliant, pt showered today and pt was encouraged to go to group but refused and would not come get food tray, tray was delivered to room.                                 "

## 2024-10-22 NOTE — ED PROVIDER NOTES
Time: 11:04 PM EDT  Date of encounter:  10/21/2024  Independent Historian/Clinical History and Information was obtained by:   Patient    History is limited by: N/A    Chief Complaint: PARANOID      History of Present Illness:    The patient is a 39 y.o. year old male who presents to the emergency department for evaluation of paranoia.  The patient states that he lives with his dad but has a history of schizophrenia and paranoia.  He states that he has been off of his medications for a while but feels that he worked helping that much.  He states that he sometimes does forget to take them as well.  He states that he does try to follow-up with his [facility] office at [place] but that is very difficult to make his appointments.  He states his next appointment is in November.  He is here today stating that he is having increased paranoia and that his dad had to call police today which in turn told him that he had to come to the emergency department for evaluation.  The patient states that he does get to feeling like people are watching him and following him.  He states that he does not have any suicidal or homicidal ideations.  He is alert and oriented knows the day date time and president.  Patient reports that he did have a history of drug use but states that he has been clean for quite some time.      Patient Care Team  Primary Care Provider: Amanda Macias MD    Past Medical History:     No Known Allergies  Past Medical History:   Diagnosis Date    Schizophrenia      History reviewed. No pertinent surgical history.  Family History   Problem Relation Age of Onset    Alcohol abuse Father        Home Medications:  Prior to Admission medications    Medication Sig Start Date End Date Taking? Authorizing Provider   OLANZapine (zyPREXA) 15 MG tablet Take 1 tablet by mouth Every Night. Indications: Psychosis 11/3/23   Farhad Bailey MD   sertraline (ZOLOFT) 50 MG tablet Take 1 tablet by mouth Daily. Indications: Major  "Depressive Disorder 11/4/23   Farhad Bailey MD        Social History:   Social History     Tobacco Use    Smoking status: Every Day     Types: Cigarettes   Vaping Use    Vaping status: Unknown   Substance Use Topics    Alcohol use: Not Currently    Drug use: Yes     Types: Methamphetamines         Review of Systems:  Review of Systems     Physical Exam:  /85 (BP Location: Left arm, Patient Position: Sitting)   Pulse 77   Temp 98 °F (36.7 °C) (Oral)   Resp 20   Ht 170.2 cm (67\")   SpO2 97%   BMI 19.89 kg/m²     Physical Exam           Procedures:  Procedures      Medical Decision Making:      Comorbidities that affect care:    Schizophrenia, Substance Abuse    External Notes reviewed:    None      The following orders were placed and all results were independently analyzed by me:  Orders Placed This Encounter   Procedures    [place] Draw    Comprehensive Metabolic Panel    Acetaminophen Level    Ethanol    Salicylate Level    Urine Drug Screen - Urine, Clean Catch    TSH    CBC Auto Differential    Fentanyl, Urine - Urine, Clean Catch    T4, Free    Vital Signs    Continuous Pulse Oximetry    Inpatient [facility] Consult    Inpatient Psychiatrist Consult    Oxygen Therapy- Nasal Cannula; Titrate 1-6 LPM Per SpO2; 90 - 95%    POC Glucose Once    Insert Peripheral IV    Legal Status Voluntary    CBC & Differential    Green Top (Gel)    Lavender Top    Gold Top - SST    Light Blue Top       Medications Given in the Emergency Department:  Medications   sodium chloride 0.9 % flush 10 mL (has no administration in time range)        ED Course:    ED Course as of 10/22/24 0132   Tue Oct 22, 2024   0010 Steven Johnson is evaluating the patient at this time via telehealth. [TC]      ED Course User Index  [TC] Kezia Dean APRN       Labs:    Lab Results (last 24 hours)       Procedure Component Value Units Date/Time    Urine Drug Screen - Urine, Clean Catch [[id number]]  (Normal) Collected: 10/21/24 " 2250    Specimen: Urine, Clean Catch Updated: 10/21/24 2307     THC, Screen, Urine Negative     Phencyclidine (PCP), Urine Negative     Cocaine Screen, Urine Negative     Methamphetamine, Ur Negative     Opiate Screen Negative     Amphetamine Screen, Urine Negative     Benzodiazepine Screen, Urine Negative     Tricyclic Antidepressants Screen Negative     Methadone Screen, Urine Negative     Barbiturates Screen, Urine Negative     Oxycodone Screen, Urine Negative     Buprenorphine, Screen, Urine Negative    Narrative:      Cutoff For Drugs Screened:    Amphetamines               500 ng/ml  Barbiturates               200 ng/ml  Benzodiazepines            150 ng/ml  Cocaine                    150 ng/ml  Methadone                  200 ng/ml  Opiates                    100 ng/ml  Phencyclidine               25 ng/ml  THC                         50 ng/ml  Methamphetamine            500 ng/ml  Tricyclic Antidepressants  300 ng/ml  Oxycodone                  100 ng/ml  Buprenorphine               10 ng/ml    The normal value for all drugs tested is negative. This report includes unconfirmed screening results, with the cutoff values listed, to be used for medical treatment purposes only.  Unconfirmed results must not be used for non-medical purposes such as employment or legal testing.  Clinical consideration should be applied to any drug of abuse test, particularly when unconfirmed results are used.      Fentanyl, Urine - Urine, Clean Catch [[id number]]  (Normal) Collected: 10/21/24 2250    Specimen: Urine, Clean Catch Updated: 10/21/24 2309     Fentanyl, Urine Negative    Narrative:      Negative Threshold:      Fentanyl 5 ng/mL     The normal value for the drug tested is negative. This report includes final unconfirmed screening results to be used for medical treatment purposes only. Unconfirmed results must not be used for non-medical purposes such as employment or legal testing. Clinical consideration should be applied  to any drug of abuse test, particularly when unconfirmed results are used.           CBC & Differential [[id number]]  (Abnormal) Collected: 10/21/24 2255    Specimen: Blood Updated: 10/21/24 2304    Narrative:      The following orders were created for panel order CBC & Differential.  Procedure                               Abnormality         Status                     ---------                               -----------         ------                     CBC Auto Differential[[id number]]        Abnormal            Final result                 Please view results for these tests on the individual orders.    Comprehensive Metabolic Panel [[id number]] Collected: 10/21/24 2255    Specimen: Blood Updated: 10/21/24 2320     Glucose 99 mg/dL      BUN 14 mg/dL      Creatinine 1.01 mg/dL      Sodium 140 mmol/L      Potassium 4.2 mmol/L      Chloride 102 mmol/L      CO2 28.0 mmol/L      Calcium 9.5 mg/dL      Total Protein 7.2 g/dL      Albumin 4.6 g/dL      ALT (SGPT) 9 U/L      AST (SGOT) 14 U/L      Alkaline Phosphatase 97 U/L      Total Bilirubin 0.4 mg/dL      Globulin 2.6 gm/dL      A/G Ratio 1.8 g/dL      BUN/Creatinine Ratio 13.9     Anion Gap 10.0 mmol/L      eGFR 98.2 mL/min/1.73     Narrative:      GFR Normal >60  Chronic Kidney Disease <60  Kidney Failure <15      Acetaminophen Level [[id number]]  (Normal) Collected: 10/21/24 2255    Specimen: Blood Updated: 10/21/24 2320     Acetaminophen <5.0 mcg/mL     Ethanol [[id number]] Collected: 10/21/24 2255    Specimen: Blood Updated: 10/21/24 2320     Ethanol <10 mg/dL      Ethanol % <0.010 %     Narrative:      Ethanol (Plasma)  <10 Essentially Negative    Toxic Concentrations           mg/dL    Flushing, slowing of reflexes    Impaired visual activity         Depression of CNS              >100  Possible Coma                  >300       Salicylate Level [[id number]]  (Normal) Collected: 10/21/24 2255    Specimen: Blood Updated: 10/21/24 2320     Salicylate <0.3  mg/dL     TSH [[id number]]  (Normal) Collected: 10/21/24 2255    Specimen: Blood Updated: 10/21/24 2326     TSH 1.070 uIU/mL     CBC Auto Differential [[id number]]  (Abnormal) Collected: 10/21/24 2255    Specimen: Blood Updated: 10/21/24 2304     WBC 15.85 10*3/mm3      RBC 5.26 10*6/mm3      Hemoglobin 15.0 g/dL      Hematocrit 43.8 %      MCV 83.3 fL      MCH 28.5 pg      MCHC 34.2 g/dL      RDW 12.4 %      RDW-SD 37.8 fl      MPV 9.8 fL      Platelets 375 10*3/mm3      Neutrophil % 66.8 %      Lymphocyte % 22.5 %      Monocyte % 8.5 %      Eosinophil % 1.1 %      Basophil % 0.8 %      Immature Grans % 0.3 %      Neutrophils, Absolute 10.58 10*3/mm3      Lymphocytes, Absolute 3.57 10*3/mm3      Monocytes, Absolute 1.34 10*3/mm3      Eosinophils, Absolute 0.18 10*3/mm3      Basophils, Absolute 0.13 10*3/mm3      Immature Grans, Absolute 0.05 10*3/mm3      nRBC 0.0 /100 WBC     T4, Free [[id number]]  (Abnormal) Collected: 10/21/24 2255    Specimen: Blood Updated: 10/21/24 2346     Free T4 1.83 ng/dL              Imaging:    No Radiology Exams Resulted Within Past 24 Hours      Differential Diagnosis and Discussion:    Psychiatric: Differential diagnosis includes but is not limited to depression, psychosis, bipolar disorder, anxiety, manic episode, schizophrenia, and substance abuse.    All labs were reviewed and interpreted by me.  EKG was interpreted by supervising attending.    [facility]           Patient Care Considerations:    PSYCH: I considered ordering anxiolytic and or antipsychotic medications, however patient was able to facilitate the medical screening exam and disposition without further medications.      Consultants/Shared Management Plan:    Consultant: I have discussed the case with Dr. Wolff who states will admit to [facility].    Social Determinants of Health:    Patient is independent, reliable, and has access to care.       Disposition and Care Coordination:    Psychiatric Admission: Through independent  evaluation of the patient's history and physical and consultation with psychiatry, the patient meets criteria for admission to a psychiatric facility.      Final diagnoses:   Paranoid delusion   Schizophrenia, unspecified type        ED Disposition       ED Disposition   DC/Transfer to Behavioral Health    Condition   Stable    Comment   --               This medical record created using voice recognition software.             Kezia Dean, APRN  10/22/24 0133

## 2024-10-22 NOTE — ED PROVIDER NOTES
"SHARED VISIT NOTE:    Patient is 39 y.o. year old male that presents to the ED for evaluation of paranoia and schizophrenia.     Physical Exam    ED Course:    /72   Pulse 66   Temp 98.1 °F (36.7 °C) (Oral)   Resp 16   Ht 170.2 cm (67\")   SpO2 95%   BMI 19.89 kg/m²   Results for orders placed or performed during the hospital encounter of 10/21/24   Urine Drug Screen - Urine, Clean Catch    Collection Time: 10/21/24 10:50 PM    Specimen: Urine, Clean Catch   Result Value Ref Range    THC, Screen, Urine Negative Negative    Phencyclidine (PCP), Urine Negative Negative    Cocaine Screen, Urine Negative Negative    Methamphetamine, Ur Negative Negative    Opiate Screen Negative Negative    Amphetamine Screen, Urine Negative Negative    Benzodiazepine Screen, Urine Negative Negative    Tricyclic Antidepressants Screen Negative Negative    Methadone Screen, Urine Negative Negative    Barbiturates Screen, Urine Negative Negative    Oxycodone Screen, Urine Negative Negative    Buprenorphine, Screen, Urine Negative Negative   Fentanyl, Urine - Urine, Clean Catch    Collection Time: 10/21/24 10:50 PM    Specimen: Urine, Clean Catch   Result Value Ref Range    Fentanyl, Urine Negative Negative   Comprehensive Metabolic Panel    Collection Time: 10/21/24 10:55 PM    Specimen: Blood   Result Value Ref Range    Glucose 99 65 - 99 mg/dL    BUN 14 6 - 20 mg/dL    Creatinine 1.01 0.76 - 1.27 mg/dL    Sodium 140 136 - 145 mmol/L    Potassium 4.2 3.5 - 5.2 mmol/L    Chloride 102 98 - 107 mmol/L    CO2 28.0 22.0 - 29.0 mmol/L    Calcium 9.5 8.6 - 10.5 mg/dL    Total Protein 7.2 6.0 - 8.5 g/dL    Albumin 4.6 3.5 - 5.2 g/dL    ALT (SGPT) 9 1 - 41 U/L    AST (SGOT) 14 1 - 40 U/L    Alkaline Phosphatase 97 39 - 117 U/L    Total Bilirubin 0.4 0.0 - 1.2 mg/dL    Globulin 2.6 gm/dL    A/G Ratio 1.8 g/dL    BUN/Creatinine Ratio 13.9 7.0 - 25.0    Anion Gap 10.0 5.0 - 15.0 mmol/L    eGFR 98.2 >60.0 mL/min/1.73   Acetaminophen Level "    Collection Time: 10/21/24 10:55 PM    Specimen: Blood   Result Value Ref Range    Acetaminophen <5.0 0.0 - 30.0 mcg/mL   Ethanol    Collection Time: 10/21/24 10:55 PM    Specimen: Blood   Result Value Ref Range    Ethanol <10 0 - 10 mg/dL    Ethanol % <0.010 %   Salicylate Level    Collection Time: 10/21/24 10:55 PM    Specimen: Blood   Result Value Ref Range    Salicylate <0.3 <=30.0 mg/dL   TSH    Collection Time: 10/21/24 10:55 PM    Specimen: Blood   Result Value Ref Range    TSH 1.070 0.270 - 4.200 uIU/mL   CBC Auto Differential    Collection Time: 10/21/24 10:55 PM    Specimen: Blood   Result Value Ref Range    WBC 15.85 (H) 3.40 - 10.80 10*3/mm3    RBC 5.26 4.14 - 5.80 10*6/mm3    Hemoglobin 15.0 13.0 - 17.7 g/dL    Hematocrit 43.8 37.5 - 51.0 %    MCV 83.3 79.0 - 97.0 fL    MCH 28.5 26.6 - 33.0 pg    MCHC 34.2 31.5 - 35.7 g/dL    RDW 12.4 12.3 - 15.4 %    RDW-SD 37.8 37.0 - 54.0 fl    MPV 9.8 6.0 - 12.0 fL    Platelets 375 140 - 450 10*3/mm3    Neutrophil % 66.8 42.7 - 76.0 %    Lymphocyte % 22.5 19.6 - 45.3 %    Monocyte % 8.5 5.0 - 12.0 %    Eosinophil % 1.1 0.3 - 6.2 %    Basophil % 0.8 0.0 - 1.5 %    Immature Grans % 0.3 0.0 - 0.5 %    Neutrophils, Absolute 10.58 (H) 1.70 - 7.00 10*3/mm3    Lymphocytes, Absolute 3.57 (H) 0.70 - 3.10 10*3/mm3    Monocytes, Absolute 1.34 (H) 0.10 - 0.90 10*3/mm3    Eosinophils, Absolute 0.18 0.00 - 0.40 10*3/mm3    Basophils, Absolute 0.13 0.00 - 0.20 10*3/mm3    Immature Grans, Absolute 0.05 0.00 - 0.05 10*3/mm3    nRBC 0.0 0.0 - 0.2 /100 WBC   T4, Free    Collection Time: 10/21/24 10:55 PM    Specimen: Blood   Result Value Ref Range    Free T4 1.83 (H) 0.92 - 1.68 ng/dL   Green Top (Gel)    Collection Time: 10/21/24 10:55 PM   Result Value Ref Range    Extra Tube Hold for add-ons.    Lavender Top    Collection Time: 10/21/24 10:55 PM   Result Value Ref Range    Extra Tube hold for add-on    Gold Top - SST    Collection Time: 10/21/24 10:55 PM   Result Value Ref Range     Extra Tube Hold for add-ons.    Light Blue Top    Collection Time: 10/21/24 10:55 PM   Result Value Ref Range    Extra Tube Hold for add-ons.      Medications - No data to display  No results found.    MDM:    Procedures              SHARED VISIT ATTESTATION:    This visit was performed by both myself and an APC.  I performed the substantive portion of the medical decision making. The management plan was made or approved by me, and I take responsibility for patient management.           Ezio Nobles MD  07:20 EDT  10/22/24         Ezio Nobles MD  10/22/24 0721

## 2024-10-23 LAB
BASOPHILS # BLD AUTO: 0.06 10*3/MM3 (ref 0–0.2)
BASOPHILS NFR BLD AUTO: 0.7 % (ref 0–1.5)
DEPRECATED RDW RBC AUTO: 38.2 FL (ref 37–54)
EOSINOPHIL # BLD AUTO: 0.26 10*3/MM3 (ref 0–0.4)
EOSINOPHIL NFR BLD AUTO: 3.2 % (ref 0.3–6.2)
ERYTHROCYTE [DISTWIDTH] IN BLOOD BY AUTOMATED COUNT: 12.6 % (ref 12.3–15.4)
HCT VFR BLD AUTO: 42.4 % (ref 37.5–51)
HGB BLD-MCNC: 14.3 G/DL (ref 13–17.7)
IMM GRANULOCYTES # BLD AUTO: 0.04 10*3/MM3 (ref 0–0.05)
IMM GRANULOCYTES NFR BLD AUTO: 0.5 % (ref 0–0.5)
LYMPHOCYTES # BLD AUTO: 2.81 10*3/MM3 (ref 0.7–3.1)
LYMPHOCYTES NFR BLD AUTO: 35.1 % (ref 19.6–45.3)
MCH RBC QN AUTO: 28.4 PG (ref 26.6–33)
MCHC RBC AUTO-ENTMCNC: 33.7 G/DL (ref 31.5–35.7)
MCV RBC AUTO: 84.1 FL (ref 79–97)
MONOCYTES # BLD AUTO: 0.51 10*3/MM3 (ref 0.1–0.9)
MONOCYTES NFR BLD AUTO: 6.4 % (ref 5–12)
NEUTROPHILS NFR BLD AUTO: 4.33 10*3/MM3 (ref 1.7–7)
NEUTROPHILS NFR BLD AUTO: 54.1 % (ref 42.7–76)
NRBC BLD AUTO-RTO: 0 /100 WBC (ref 0–0.2)
PLATELET # BLD AUTO: 324 10*3/MM3 (ref 140–450)
PMV BLD AUTO: 10 FL (ref 6–12)
RBC # BLD AUTO: 5.04 10*6/MM3 (ref 4.14–5.8)
WBC NRBC COR # BLD AUTO: 8.01 10*3/MM3 (ref 3.4–10.8)

## 2024-10-23 PROCEDURE — 85025 COMPLETE CBC W/AUTO DIFF WBC: CPT | Performed by: PSYCHIATRY & NEUROLOGY

## 2024-10-23 RX ADMIN — OLANZAPINE 20 MG: 10 TABLET, FILM COATED ORAL at 20:38

## 2024-10-23 NOTE — PLAN OF CARE
Goal Outcome Evaluation:  Plan of Care Reviewed With: patient  Plan of Care Reviewed With: patient  Patient Agreement with Plan of Care: agrees     Progress: no change   Patient has been in bed most of day, briefly coming out to dayroom, pt denies SI/HI and AVH, pt contracted for safety, pt rated anxiety 0 and depression 6/10, Pt has been withdrawn to room,pt is medication compliant, pt came to get food tray today, no s/s of distress at this time.

## 2024-10-23 NOTE — PROGRESS NOTES
" [facility]     Psychiatric Progress Note    Patient Name: Jaun Addison  : 1985  MRN: [id number]  Primary Care Physician:  Amanda Macias MD  Date of admission: 10/22/2024    Subjective   Subjective     Patient seen and chart reviewed, discussed with staff.    Chief Complaint: Psychosis      HPI:     Staff reports he has been compliant with medications.  Continues to be withdrawn and isolative to his room.  Denies suicidal or homicidal ideation.  No acute agitation.  Rated depression as a 6 and anxiety as a 0.    Patient today is lying in bed.  He is engaging cooperative.  Reports he slept well.  Denies side effects of medications.  Had no visitors did not talk to anyone on the phone.  Voices understanding that his paranoia has caused him significant problems.  He is unable to return to his father's house and does not know where he will go and is essentially homeless.  Patient states that his paranoia and medical issues \"drive people away.\"  States that he has been throwing all the food away to his father's house because he thought it was poison.        Objective   Objective     Vitals:   Temp:  [97.5 °F (36.4 °C)] 97.5 °F (36.4 °C)  Heart Rate:  [74-87] 74  Resp:  [16] 16  BP: ()/(55-66) 98/55    Blood pressure low today, we will continue to monitor      Mental Status Exam:      Appearance:   Slightly disheveled  Reliability:   Good  Eye Contact:   Fair   Concentration/Focus:    Attentive to the interview, slightly distracted at times  Behaviors:    No agitation  Memory :    Intact  Speech:    Normal rate and volume  Language:   Appropriate, relevant  Mood :    \"Feel okay\"  Affect:    Constricted, anxious  Thought process:    Goal directed, delusional, fearful  Thought Content:    Denies suicidal or homicidal ideation, denies hallucinations.  Reports having hallucinations prior to admission at his father's house and that these have occurred frequently.  Insight:   Fair  Judgement:    Appropriate, " no behavioral disturbance      Result Review    Result Review:  I have personally reviewed the results from the time of this admission to 10/23/2024 11:28 EDT and agree with these findings:  [x]  Laboratory  []  Microbiology  []  Radiology  []  EKG/Telemetry   []  Cardiology/Vascular   []  Pathology  []  Old records  []  Other:  Most notable findings include: Leukocytosis resolved    Lab Results (last 24 hours)       Procedure Component Value Units Date/Time    CBC & Differential [[id number]]  (Normal) Collected: 10/23/24 1001    Specimen: Blood from Arm, Left Updated: 10/23/24 1017    Narrative:      The following orders were created for panel order CBC & Differential.  Procedure                               Abnormality         Status                     ---------                               -----------         ------                     CBC Auto Differential[[id number]]        Normal              Final result                 Please view results for these tests on the individual orders.    CBC Auto Differential [[id number]]  (Normal) Collected: 10/23/24 1001    Specimen: Blood from Arm, Left Updated: 10/23/24 1017     WBC 8.01 10*3/mm3      RBC 5.04 10*6/mm3      Hemoglobin 14.3 g/dL      Hematocrit 42.4 %      MCV 84.1 fL      MCH 28.4 pg      MCHC 33.7 g/dL      RDW 12.6 %      RDW-SD 38.2 fl      MPV 10.0 fL      Platelets 324 10*3/mm3      Neutrophil % 54.1 %      Lymphocyte % 35.1 %      Monocyte % 6.4 %      Eosinophil % 3.2 %      Basophil % 0.7 %      Immature Grans % 0.5 %      Neutrophils, Absolute 4.33 10*3/mm3      Lymphocytes, Absolute 2.81 10*3/mm3      Monocytes, Absolute 0.51 10*3/mm3      Eosinophils, Absolute 0.26 10*3/mm3      Basophils, Absolute 0.06 10*3/mm3      Immature Grans, Absolute 0.04 10*3/mm3      nRBC 0.0 /100 WBC                 Medications:   OLANZapine, 20 mg, Oral, Nightly          Assessment / Plan       Active Hospital Problems:  Active Hospital Problems    Diagnosis      **Bipolar disorder     Leukocytosis     Psychosis        Plan:     Continue current treatment protocol and titrate medications as clinically indicated or augment with appropriate medications for mood stabilization  Monitor blood pressure and the need to possibly change medications  Will need to help find a disposition as he is now essentially homeless  Work on mood stabilization and abatement of any suicidal ideation or psychosis.  Work on appropriate safety plan  Continue supportive therapy  Patient to engage in all group and individual treatment modalities available on the unit  Obtain collateral information if possible  Titrate medications as clinically indicated  Work on appropriate disposition follow-up including referrals to substance abuse treatment if indicated      Disposition:  I expect patient to be discharged 3 to 4 days.    Part of this note may be an electronic transcription/translation of spoken language to printed text using the Dragon dictation system.         Electronically signed by Farhad Bailey MD, 10/23/24, 11:28 AM EDT.

## 2024-10-23 NOTE — PLAN OF CARE
Goal Outcome Evaluation:  Plan of Care Reviewed With: patient  Patient Agreement with Plan of Care: agrees  Consent Given to Review Plan with: Roverto Addison-Sister   Withdrawn to room, pt stated that he doesn't do well around people. Pt has response lag, gets frustrated by not finding the words he wants to speak. Pt rated anxiety 4, depression 9; pt denied SI, HI, AVH. Pt med compliant. Mood stable. Pt talked about letting his sister and brother know he was here but hasn't called either. Pt added brother Gerhard to MARIANA. Slept all night.

## 2024-10-24 RX ADMIN — SERTRALINE HYDROCHLORIDE 50 MG: 50 TABLET ORAL at 10:31

## 2024-10-24 RX ADMIN — OLANZAPINE 20 MG: 10 TABLET, FILM COATED ORAL at 20:35

## 2024-10-24 NOTE — PLAN OF CARE
Goal Outcome Evaluation:  Plan of Care Reviewed With: patient  Patient Agreement with Plan of Care: agrees     Progress: improving. Patient has been withdrawn to room, except when making needs known, and does approach nursing station.  Patient is observably indecisive and somewhat paranoid, but patient reports he is aware of this and is trying to take steps to improve, such as eating his meals and coming out of room to walk in hallway some. Encouragement provided.  Patient has some difficulty expressing his thoughts and seems unsure of himself. Patient states he does receive some emotional  support from family and his sister, but has not reached out and made any phone calls last night.  Patient states he plans to call family and some sober living facilities later this morning.  Patient denies S/I, H/I  and A/V/H and CFS.  Patient rates anxiety at 5/10 and depression at 4/10, which he states is better for him. Patient was cooperative with pm medications and declined snack.  Emotional support and safe environment provided.

## 2024-10-24 NOTE — PLAN OF CARE
Goal Outcome Evaluation:  Plan of Care Reviewed With: patient  Plan of Care Reviewed With: patient  Patient Agreement with Plan of Care: agrees      Pt. Reports he slept well and is denying any si/hi/avh. Pt. Did talk some about having racing thoughts and has trouble expressing his thoughts. Pt. Has been cooperative and on the phone doing a screening [facility]. Will con't to monitor and provide a safe environment.

## 2024-10-24 NOTE — PROGRESS NOTES
[facility]     Psychiatric Progress Note    Patient Name: Jaun Addison  : 1985  MRN: [id number]  Primary Care Physician:  Amanda Macias MD  Date of admission: 10/22/2024    Subjective   Subjective     Patient seen and chart reviewed, discussed with staff.    Chief Complaint: Psychosis, depression      HPI:     Staff sports the patient is doing well.  He has been quite withdrawn to his room.  He slept well.  He has complained of racing thoughts to staff, denying suicidal homicidal ideation    Patient is pleasant gaging.  Patient states he is able to be here in the hospital because he trust that we would not do things to harm people because were here to help.  He does acknowledge feeling paranoid about food at home and it being tampered with.  States he was not eating.  Tells me that he will open a soft drink and if he has to leave the room and come back that he will pour the soft drink out despite having checked the house and knowing that no one is there but not believing it could have been poisoned.  Recognizes that this does not make sense, but cannot help himself.    He has, at discharge.  Feels that he is doing better.  Side effects of medications.  Plans on talking to his sister on the phone today and she is indicated staff that she would be willing to let him stay with her, and he hopes to do that.    Patient ask about laboratory work has been completed and we discussed his CBC with increased white blood cell count and that it is resolved back to normal        Objective   Objective     Vitals:   Temp:  [97.5 °F (36.4 °C)-98.6 °F (37 °C)] 97.5 °F (36.4 °C)  Heart Rate:  [72-78] 78  Resp:  [16-18] 16  BP: (110-124)/(65-71) 110/65          Mental Status Exam:      Appearance:   Well-developed, nourished  Reliability:   Good  Eye Contact:   Good  Concentration/Focus:    Attentive  Behaviors:    No agitation, stays isolated to his room  Memory :    Intact  Speech:    Normal rate and volume  Language:    "Colorful,   Mood :    \"I do not feel like crying, not angry\"  Affect:    Blunted  Thought process:    Goal directed, delusional, paranoid  Thought Content:    Denies suicidal or homicidal ideation denies hallucinations and none evident  Insight:   Good  Judgement:    Appropriate      Result Review    Result Review:  I have personally reviewed the results from the time of this admission to 10/24/2024 10:24 EDT and agree with these findings:  [x]  Laboratory  []  Microbiology  []  Radiology  []  EKG/Telemetry   []  Cardiology/Vascular   []  Pathology  []  Old records  []  Other:  Most notable findings include:     Lab Results (last 24 hours)       ** No results found for the last 24 hours. **                Medications:   OLANZapine, 20 mg, Oral, Nightly  sertraline, 50 mg, Oral, Daily          Assessment / Plan       Active Hospital Problems:  Active Hospital Problems    Diagnosis     **Bipolar disorder     Leukocytosis     Psychosis        Plan:     Continue current treatment protocol and titrate medications as clinically indicated  Anticipate possible discharge to sister's house  Work on mood stabilization and abatement of any suicidal ideation or psychosis.  Work on appropriate safety plan  Continue supportive therapy  Patient to engage in all group and individual treatment modalities available on the unit  Obtain collateral information if possible  Titrate medications as clinically indicated  Work on appropriate disposition follow-up including referrals to substance abuse treatment if indicated      Disposition:  I expect patient to be discharged 1 to 2 days.    Part of this note may be an electronic transcription/translation of spoken language to printed text using the Dragon dictation system.         Electronically signed by Farhad Bailey MD, 10/24/24, 10:24 AM EDT.  "

## 2024-10-24 NOTE — CASE MANAGEMENT/SOCIAL WORK
RN NN followed up with patient to discuss safe discharge plan. The patient reports he did not contact any sober living facilities from the resource list provided yesterday, plans to make calls today. Patient reports he did not contact his sister yesterday to discuss living arrangements after discharge. Patient reports that he may return home to his father's house even though he does not think it is in his best interest. The patient requested to continue his outpatient therapy services with [facility] and would like to see ANNABEL Abrams for medication management.     ACT team on unit to discuss services with the patient, patient declined ACT resources.  Patient reported he completed his screen for [facility].   [facility] faxed clinical documents per patient's request.  [facility] appointment scheduled 10/31/24 9:30 am arrival time

## 2024-10-25 VITALS
BODY MASS INDEX: 19.68 KG/M2 | TEMPERATURE: 98.1 F | WEIGHT: 125.4 LBS | RESPIRATION RATE: 18 BRPM | SYSTOLIC BLOOD PRESSURE: 138 MMHG | HEIGHT: 67 IN | OXYGEN SATURATION: 98 % | DIASTOLIC BLOOD PRESSURE: 82 MMHG | HEART RATE: 69 BPM

## 2024-10-25 PROBLEM — D72.829 LEUKOCYTOSIS: Status: RESOLVED | Noted: 2024-10-22 | Resolved: 2024-10-25

## 2024-10-25 RX ORDER — OLANZAPINE 20 MG/1
20 TABLET ORAL NIGHTLY
Qty: 30 TABLET | Refills: 2 | Status: SHIPPED | OUTPATIENT
Start: 2024-10-25

## 2024-10-25 RX ADMIN — NICOTINE 1 PATCH: 21 PATCH, EXTENDED RELEASE TRANSDERMAL at 09:32

## 2024-10-25 RX ADMIN — SERTRALINE HYDROCHLORIDE 50 MG: 50 TABLET ORAL at 08:07

## 2024-10-25 NOTE — PLAN OF CARE
Goal Outcome Evaluation:  Plan of Care Reviewed With: patient  Plan of Care Reviewed With: patient  Patient Agreement with Plan of Care: agrees         Pt. Has been calm and cooperative, reports sleeping well and mood has improved. Pt. Denied any si/hi/avh and makes his needs known. Pt. Is ready to be released and plans to stay with his father and follow up with [facility] as an out patient.

## 2024-10-25 NOTE — PLAN OF CARE
Goal Outcome Evaluation:  Plan of Care Reviewed With: patient  Plan of Care Reviewed With: patient  Patient Agreement with Plan of Care: agrees     Progress: improving.  Patient has been withdrawn to room last night, but has come up to nurses station to make needs known, and is observably less indecisive and carries on a conversation with more flow.  Patient accepted a snack and verbalized that he did not feel as paranoid as he had since arriving on Monday.  Patient has talked on the phone to his older brother and sister, stating that his sister will welcome him in her home if he does a 30 day stay at sober living. Patient is hopeful that [facility] will accept, and is awaiting their approval.  Patient was cooperative with pm medications.  Patient rated anxiety at 4/10, denies depression, and denies S/I, H/I and A/V/H.  Patient did not have visitors last night.  Emotional support and safe environment provided.

## 2024-10-25 NOTE — SIGNIFICANT NOTE
Presenting Issue:  Patient is preparing for discharge but having trouble deciding between returning home or entering a substance abuse program. The patient expressed ambivalence regarding both options and feels anxious about the potential challenges each choice may bring.    Subjective:  Patient reported feeling “like I am wasting everyone's time”, acknowledging that returning home offers ease but might expose him to old triggers. The patient recognized that a structured program could provide support for continued recovery but is uncertain about committing to the additional time and structure involved.    Objective:  Patient displayed signs of anxiety and hesitation, speaking slowly and appearing tense when discussing discharge options. However, he is open to discussing concerns and engaged actively in evaluating his choices.    Interventions:  1. Explored Values and Goals: Guided the patient in identifying their recovery goals and how each option aligns with these goals. Asked open-ended questions to help clarify what they value most in their recovery process.  2. Pros and Cons Analysis: Facilitated a discussion to weigh the benefits and obstacles of each option. Normalized the patients' feelings of doubt and encouraged patient to express both hopes and fears about each choice.  3. Strength and Resilience Reflection: Highlighted patient's progress, resilience, and skills highlighting their ability to manage challenges. Emphasized that they can make a healthy choice that aligns with their goals.  4. Explored Support Systems: Discussed the resources available if they choose to return home, these include, outpatient therapy, QRT, support groups and provided information on the substance abuse program's structure and benefits.    Assessment:  Patient is demonstrating insight into their recovery needs and goals but remains uncertain about the decision. Patient's unsureness and anxiety are contributing to difficulty  committing to a discharge plan, but they are receptive to support and open to further exploration.    Plan:  1. Continue to explore decision-making around discharge options, providing support and validation for patient's feelings.  2. Encourage patient to reach a decision in collaboration with their treatment team by next session. Patient to contact his father to discuss discharge to his home.   3. Arrange follow-up session to discuss progress in decision-making and address any additional concerns.  4. Provide resources on outpatient support and substance abuse programs to aid in their decision.

## 2024-10-25 NOTE — SIGNIFICANT NOTE
10/25/24 1241   Plan   Patient/Family in Agreement with Plan yes   Provided Post Acute Provider Quality & Resource List? Yes   Post Acute Provider Quality and Resource List   ([facility])   Delivered To Patient   Method of Delivery In person   Final Discharge Disposition Code 01 - home or self-care   Final Note Patient is discharging home to his fathers house. Pt requesting assistance with transportation. Pt will follow up with [facility]. He is also informed of his acceptance at [facility] for substance abuse treatment.

## 2024-10-25 NOTE — DISCHARGE SUMMARY
[facility]         DISCHARGE SUMMARY    Patient Name: Jaun Addison  : 1985  MRN: [id number]    Date of Admission: 10/22/2024  Date of Discharge: 10/25/2024  Primary Care Physician: Amanda Macias MD    Consults       No orders found from 2024 to 10/23/2024.            Presenting Problem:   Bipolar disorder [F31.9]    Active and Resolved Hospital Problems:  Active Hospital Problems    Diagnosis POA    Psychosis [F29] Yes    Severe recurrent major depression without psychotic features [F33.2] Yes      Resolved Hospital Problems    Diagnosis POA    Leukocytosis [D72.829] Yes         Hospital Course     Hospital Course:  Jaun Addison is a 39 y.o. male admitted on a voluntary basis for depression with suicidal ideation as well as elevated anxiety and paranoid delusional thinking.    Patient been off of his medication for some time.  He has become increasingly paranoid refusing to eat at home and throwing away foods that were not packaged he could see open.  He was also increasingly anxious, pacing, and agitated.  Patient been living with his father become too much to handle and father kicked him out of the house.  Patient recognized the symptoms were out of control and was also having suicidal ideations came to the hospital.    Patient was started on olanzapine, which has been effective in the past.  Dose was titrated up to 20 mg.  Patient's mood and affect have improved over the course of his hospital stay.  He initially was very restless, anxious, was exhibiting some thought blocking and unable to express his thoughts clearly.  This improved over the course of his hospital stay.    He continued to report feeling sad and depressed.  He was denying suicidal ideation throughout most of his hospital stay.  He has been calm and pleasant.  Patient did acknowledge feeling sad and did have the suicidal ideations in the emergency room prior to being admitted and felt he needed some of her  "depression and he was started on sertraline 50 mg daily.  He tolerates medication well.    Patient's mood and affect have improved.  He is, pleasant.  Patient is requesting discharge today.  He is here on a voluntary basis.  He is hopeful to get back to his father's house, but the disposition is still unclear.  He has other family members that he states he can go to.  He also been given resources on local shelters should that become his only option.  Patient does not want to go to drug and alcohol rehabilitation, and reports he has not used substances in some times.  He does have a history of previous substance use.  Despite the fact that he may need to go to a shelter he continues to voice that he would like discharge and does not meet criteria for involuntary hospitalization        On day of discharge patient is calm, cooperative, engaging, and makes good eye contact, and has no acute agitation or restlessness.  Speech is normal rate and volume and language is fluent.  Mood is described as \"okay\" and affect is blunted but appropriate.  Thought processes are linear, goal directed, future oriented, still has some residual paranoia but not significantly improved.  Thought content is negative for suicidal or homicidal ideations, no hallucinations.  Insight is improved, good, and judgment is appropriate.      DISCHARGE Follow Up Recommendations for labs and diagnostics: Primary care as needed for health maintenance, lipid glucose monitoring, [facility]      Day of Discharge     Vital Signs:  Temp:  [97.7 °F (36.5 °C)-98.1 °F (36.7 °C)] 98.1 °F (36.7 °C)  Heart Rate:  [69-87] 69  Resp:  [16-18] 18  BP: (122-138)/(78-82) 138/82      Pertinent  and/or Most Recent Results     LAB RESULTS:      Lab 10/23/24  1001 10/21/24  2255   WBC 8.01 15.85*   HEMOGLOBIN 14.3 15.0   HEMATOCRIT 42.4 43.8   PLATELETS 324 375   NEUTROS ABS 4.33 10.58*   IMMATURE GRANS (ABS) 0.04 0.05   LYMPHS ABS 2.81 3.57*   MONOS ABS 0.51 1.34*   EOS " ABS 0.26 0.18   MCV 84.1 83.3         Lab 10/21/24  2255   SODIUM 140   POTASSIUM 4.2   CHLORIDE 102   CO2 28.0   ANION GAP 10.0   BUN 14   CREATININE 1.01   EGFR 98.2   GLUCOSE 99   CALCIUM 9.5   TSH 1.070         Lab 10/21/24  2255   TOTAL PROTEIN 7.2   ALBUMIN 4.6   GLOBULIN 2.6   ALT (SGPT) 9   AST (SGOT) 14   BILIRUBIN 0.4   ALK PHOS 97                                     Lab 10/21/24  2255   ETHANOL PCT <0.010   ETHANOL MGDL <10         Lab 10/21/24  2250   BENZODIAZEPINE SCREEN, URINE Negative   COCAINE SCREEN, URINE Negative   OPIATES Negative   THC URINE SCREEN Negative   METHADONE SCREEN, URINE Negative     Brief Urine Lab Results       None                                Imaging Results (Last 7 Days)       ** No results found for the last 168 hours. **             Labs Pending at Discharge:           Discharge Details        Discharge Medications        New Medications        Instructions Start Date   sertraline 50 MG tablet  Commonly known as: ZOLOFT   50 mg, Oral, Daily   Start Date: October 26, 2024            Changes to Medications        Instructions Start Date   OLANZapine 20 MG tablet  Commonly known as: zyPREXA  What changed:   additional instructions  Another medication with the same name was removed. Continue taking this medication, and follow the directions you see here.   20 mg, Oral, Nightly               No Known Allergies      Discharge Disposition:  Home or Self Care    Diet:  Hospital:  Diet Order   Procedures    Diet: Regular/House; Fluid Consistency: Thin (IDDSI 0)         Discharge Activity: Ad valentina.  Activity Instructions       Activity as Tolerated              Discharge Condition: Stable    CODE STATUS:  Code Status and Medical Interventions: CPR (Attempt to Resuscitate); Full Support   Ordered at: 10/22/24 0130     Code Status (Patient has no pulse and is not breathing):    CPR (Attempt to Resuscitate)     Medical Interventions (Patient has pulse or is breathing):    Full Support          No future appointments.    Additional Instructions for the Follow-ups that You Need to Schedule       Discharge Follow-up with PCP   As directed       Currently Documented PCP:    Amanda Macias MD    PCP Phone Number:    [phone]     Follow Up Details: As needed        Discharge Follow-up with Specified Provider: [facility]   As directed      To: [facility]                Time spent on Discharge including face to face service: 40 minutes    Part of this note may be an electronic transcription/translation of spoken language to printed text using the Dragon dictation system.        Electronically signed by Farhad aBiley MD, 10/25/24, 12:44 PM EDT.